# Patient Record
Sex: FEMALE | Race: AMERICAN INDIAN OR ALASKA NATIVE | NOT HISPANIC OR LATINO | Employment: OTHER | URBAN - NONMETROPOLITAN AREA
[De-identification: names, ages, dates, MRNs, and addresses within clinical notes are randomized per-mention and may not be internally consistent; named-entity substitution may affect disease eponyms.]

---

## 2017-04-12 ENCOUNTER — OFFICE VISIT (OUTPATIENT)
Dept: CARDIOLOGY | Facility: PHYSICIAN GROUP | Age: 72
End: 2017-04-12
Payer: MEDICARE

## 2017-04-12 VITALS
OXYGEN SATURATION: 93 % | HEIGHT: 62 IN | SYSTOLIC BLOOD PRESSURE: 150 MMHG | DIASTOLIC BLOOD PRESSURE: 60 MMHG | WEIGHT: 211 LBS | HEART RATE: 91 BPM | BODY MASS INDEX: 38.83 KG/M2

## 2017-04-12 DIAGNOSIS — I45.10 RBBB: ICD-10-CM

## 2017-04-12 DIAGNOSIS — I49.3 SYMPTOMATIC PVCS: ICD-10-CM

## 2017-04-12 DIAGNOSIS — I10 ESSENTIAL HYPERTENSION, BENIGN: ICD-10-CM

## 2017-04-12 DIAGNOSIS — E78.2 MIXED HYPERLIPIDEMIA: ICD-10-CM

## 2017-04-12 DIAGNOSIS — E03.9 ACQUIRED HYPOTHYROIDISM: ICD-10-CM

## 2017-04-12 DIAGNOSIS — R00.2 PALPITATIONS: ICD-10-CM

## 2017-04-12 PROBLEM — E78.00 HYPERCHOLESTEREMIA: Status: ACTIVE | Noted: 2017-04-12

## 2017-04-12 PROCEDURE — 93010 ELECTROCARDIOGRAM REPORT: CPT | Performed by: INTERNAL MEDICINE

## 2017-04-12 PROCEDURE — 99204 OFFICE O/P NEW MOD 45 MIN: CPT | Performed by: INTERNAL MEDICINE

## 2017-04-12 PROCEDURE — 3017F COLORECTAL CA SCREEN DOC REV: CPT | Mod: 8P | Performed by: INTERNAL MEDICINE

## 2017-04-12 PROCEDURE — 1101F PT FALLS ASSESS-DOCD LE1/YR: CPT | Mod: 8P | Performed by: INTERNAL MEDICINE

## 2017-04-12 PROCEDURE — G8432 DEP SCR NOT DOC, RNG: HCPCS | Performed by: INTERNAL MEDICINE

## 2017-04-12 PROCEDURE — G8419 CALC BMI OUT NRM PARAM NOF/U: HCPCS | Performed by: INTERNAL MEDICINE

## 2017-04-12 PROCEDURE — 3014F SCREEN MAMMO DOC REV: CPT | Mod: 8P | Performed by: INTERNAL MEDICINE

## 2017-04-12 PROCEDURE — 4040F PNEUMOC VAC/ADMIN/RCVD: CPT | Mod: 8P | Performed by: INTERNAL MEDICINE

## 2017-04-12 PROCEDURE — 1036F TOBACCO NON-USER: CPT | Performed by: INTERNAL MEDICINE

## 2017-04-12 RX ORDER — ERGOCALCIFEROL 1.25 MG/1
CAPSULE ORAL
COMMUNITY
End: 2023-11-18

## 2017-04-12 RX ORDER — ASCORBIC ACID 500 MG
500 TABLET ORAL DAILY
COMMUNITY

## 2017-04-12 RX ORDER — ACETAMINOPHEN 325 MG/1
650 TABLET ORAL EVERY 4 HOURS PRN
COMMUNITY
End: 2023-11-18

## 2017-04-12 ASSESSMENT — ENCOUNTER SYMPTOMS
BRUISES/BLEEDS EASILY: 0
SEIZURES: 0
FLANK PAIN: 0
FALLS: 0
PND: 0
HEADACHES: 0
LOSS OF CONSCIOUSNESS: 0
POLYDIPSIA: 0
COUGH: 0
WHEEZING: 0
NERVOUS/ANXIOUS: 0
CHILLS: 0
FEVER: 0
DEPRESSION: 0
MYALGIAS: 0
ORTHOPNEA: 0
BLOOD IN STOOL: 0

## 2017-04-12 ASSESSMENT — LIFESTYLE VARIABLES: SUBSTANCE_ABUSE: 0

## 2017-04-12 NOTE — PROGRESS NOTES
Subjective:   Yamileth Walton is a 71 y.o. female who presents today for evaluation of palpitation. These occur spontaneously and are characterized by skipped beats. Holter monitor demonstrated isolated ventricular and atrial ectopy correlating with symptoms.  She has not had chest pain nor dyspnea. She has had no syncope nor near syncope. She does have a history of hypertension and hypothyroidism as well as mixed hyperlipidemia. She has not had sustained arrhythmia. She does occasionally notice her heart rate less than 60 and holds her metoprolol when that occurs.    Past Medical History   Diagnosis Date   • Chronic ITP (idiopathic thrombocytopenia) (CMS-HCC)      Resolved with splenectomy   • Essential hypertension, benign    • Mixed hyperlipidemia    • Acquired hypothyroidism    • RBBB    • Palpitations      Past Surgical History   Procedure Laterality Date   • Abdominal hysterectomy total     • Tonsillectomy     • Splenectomy  1961     maintains immunizations with [facility]     Family History   Problem Relation Age of Onset   • Hypertension Mother    • Thyroid Sister      History   Smoking status   • Never Smoker    Smokeless tobacco   • Never Used     Allergies   Allergen Reactions   • Aspirin      Outpatient Encounter Prescriptions as of 4/12/2017   Medication Sig Dispense Refill   • vitamin D, Ergocalciferol, (DRISDOL) 26214 UNITS Cap capsule Take  by mouth every 7 days.     • MECLIZINE HCL PO Take  by mouth.     • acetaminophen (TYLENOL) 325 MG Tab Take 650 mg by mouth every four hours as needed.     • ascorbic acid (ASCORBIC ACID) 500 MG Tab Take 500 mg by mouth every day.     • metoprolol (LOPRESSOR) 25 MG Tab Take 12.5 mg by mouth 2 times a day. Hold if HR<60     • LISINOPRIL PO Take 40 mg by mouth every day.     • LEVOTHYROXINE SODIUM Take 88 mcg by mouth every day.     • [DISCONTINUED] benzonatate (TESSALON) 200 MG capsule Take 1 Cap by mouth 3 times a day as needed for Cough. (Patient not taking: Reported  "on 4/12/2017) 30 Cap 0     No facility-administered encounter medications on file as of 4/12/2017.     Review of Systems   Constitutional: Negative for fever, chills and malaise/fatigue.   HENT: Negative for nosebleeds.    Respiratory: Negative for cough and wheezing.    Cardiovascular: Negative for orthopnea and PND.   Gastrointestinal: Negative for blood in stool and melena.   Genitourinary: Negative for hematuria and flank pain.   Musculoskeletal: Negative for myalgias and falls.   Skin: Negative for rash.   Neurological: Negative for seizures, loss of consciousness and headaches.   Endo/Heme/Allergies: Negative for polydipsia. Does not bruise/bleed easily.   Psychiatric/Behavioral: Negative for depression and substance abuse. The patient is not nervous/anxious.         Objective:   /60 mmHg  Pulse 91  Ht 1.575 m (5' 2.01\")  Wt 95.709 kg (211 lb)  BMI 38.58 kg/m2  SpO2 93%    Physical Exam   Constitutional: She is oriented to person, place, and time. She appears well-developed and well-nourished. No distress.   HENT:   Mouth/Throat: Oropharynx is clear and moist.   Eyes: Conjunctivae are normal. No scleral icterus.   Neck: No JVD present. No thyromegaly present.   Cardiovascular: Normal rate, regular rhythm, S1 normal and intact distal pulses.  Exam reveals no gallop.    No murmur heard.  Widely but physiologically split second sound   Pulmonary/Chest: Effort normal and breath sounds normal.   Abdominal: Soft. Bowel sounds are normal. She exhibits no abdominal bruit. There is no hepatomegaly.   Musculoskeletal: She exhibits no edema.   Neurological: She is alert and oriented to person, place, and time.   Skin: Skin is warm and dry. She is not diaphoretic.   Psychiatric: She has a normal mood and affect. Thought content normal.       Assessment:     1. Palpitations  [facility] EKG (Clinic Performed)   2. Symptomatic PVCs     3. RBBB     4. Essential hypertension, benign     5. Mixed hyperlipidemia     6. " Acquired hypothyroidism       We need to exclude structural heart disease.  Her blood pressure control is borderline and she has not been taking her metoprolol regularly but only as needed for tachycardia. Lipid and thyroid status are as described by Dr. Davies.    Medical Decision Making:  Today's Assessment / Status / Plan:     Take the metoprolol twice daily holding for slow heart rate rather than waiting for fast heart rate. Echocardiogram. Follow-up based on that result. I will be seeing her in no more than 2 months regardless but immediately if palpitations worsen.  Thank you for the opportunity to see her and participate in her care.

## 2017-04-12 NOTE — Clinical Note
[facility] [address]  Phone: [phone]  Fax: [phone]              Yamileth Walton  1945    Encounter Date: 4/12/2017    Bala Miranda M.D.          PROGRESS NOTE:  Subjective:   Yamileth Walton is a 71 y.o. female who presents today for evaluation of palpitation. These occur spontaneously and are characterized by skipped beats. Holter monitor demonstrated isolated ventricular and atrial ectopy correlating with symptoms.  She has not had chest pain nor dyspnea. She has had no syncope nor near syncope. She does have a history of hypertension and hypothyroidism as well as mixed hyperlipidemia. She has not had sustained arrhythmia. She does occasionally notice her heart rate less than 60 and holds her metoprolol when that occurs.    Past Medical History   Diagnosis Date   • Chronic ITP (idiopathic thrombocytopenia) (CMS-HCC)      Resolved with splenectomy   • Essential hypertension, benign    • Mixed hyperlipidemia    • Acquired hypothyroidism    • RBBB    • Palpitations      Past Surgical History   Procedure Laterality Date   • Abdominal hysterectomy total     • Tonsillectomy     • Splenectomy  1961     maintains immunizations with [facility]     Family History   Problem Relation Age of Onset   • Hypertension Mother    • Thyroid Sister      History   Smoking status   • Never Smoker    Smokeless tobacco   • Never Used     Allergies   Allergen Reactions   • Aspirin      Outpatient Encounter Prescriptions as of 4/12/2017   Medication Sig Dispense Refill   • vitamin D, Ergocalciferol, (DRISDOL) 82278 UNITS Cap capsule Take  by mouth every 7 days.     • MECLIZINE HCL PO Take  by mouth.     • acetaminophen (TYLENOL) 325 MG Tab Take 650 mg by mouth every four hours as needed.     • ascorbic acid (ASCORBIC ACID) 500 MG Tab Take 500 mg by mouth every day.     • metoprolol (LOPRESSOR) 25 MG Tab Take 12.5 mg by mouth 2 times a  "day. Hold if HR<60     • LISINOPRIL PO Take 40 mg by mouth every day.     • LEVOTHYROXINE SODIUM Take 88 mcg by mouth every day.     • [DISCONTINUED] benzonatate (TESSALON) 200 MG capsule Take 1 Cap by mouth 3 times a day as needed for Cough. (Patient not taking: Reported on 4/12/2017) 30 Cap 0     No facility-administered encounter medications on file as of 4/12/2017.     Review of Systems   Constitutional: Negative for fever, chills and malaise/fatigue.   HENT: Negative for nosebleeds.    Respiratory: Negative for cough and wheezing.    Cardiovascular: Negative for orthopnea and PND.   Gastrointestinal: Negative for blood in stool and melena.   Genitourinary: Negative for hematuria and flank pain.   Musculoskeletal: Negative for myalgias and falls.   Skin: Negative for rash.   Neurological: Negative for seizures, loss of consciousness and headaches.   Endo/Heme/Allergies: Negative for polydipsia. Does not bruise/bleed easily.   Psychiatric/Behavioral: Negative for depression and substance abuse. The patient is not nervous/anxious.         Objective:   /60 mmHg  Pulse 91  Ht 1.575 m (5' 2.01\")  Wt 95.709 kg (211 lb)  BMI 38.58 kg/m2  SpO2 93%    Physical Exam   Constitutional: She is oriented to person, place, and time. She appears well-developed and well-nourished. No distress.   HENT:   Mouth/Throat: Oropharynx is clear and moist.   Eyes: Conjunctivae are normal. No scleral icterus.   Neck: No JVD present. No thyromegaly present.   Cardiovascular: Normal rate, regular rhythm, S1 normal and intact distal pulses.  Exam reveals no gallop.    No murmur heard.  Widely but physiologically split second sound   Pulmonary/Chest: Effort normal and breath sounds normal.   Abdominal: Soft. Bowel sounds are normal. She exhibits no abdominal bruit. There is no hepatomegaly.   Musculoskeletal: She exhibits no edema.   Neurological: She is alert and oriented to person, place, and time.   Skin: Skin is warm and dry. " She is not diaphoretic.   Psychiatric: She has a normal mood and affect. Thought content normal.       Assessment:     1. Palpitations  [facility] EKG (Clinic Performed)   2. Symptomatic PVCs     3. RBBB     4. Essential hypertension, benign     5. Mixed hyperlipidemia     6. Acquired hypothyroidism       We need to exclude structural heart disease.  Her blood pressure control is borderline and she has not been taking her metoprolol regularly but only as needed for tachycardia. Lipid and thyroid status are as described by Dr. Davies.    Medical Decision Making:  Today's Assessment / Status / Plan:     Take the metoprolol twice daily holding for slow heart rate rather than waiting for fast heart rate. Echocardiogram. Follow-up based on that result. I will be seeing her in no more than 2 months regardless but immediately if palpitations worsen.  Thank you for the opportunity to see her and participate in her care.        No Recipients

## 2017-04-17 DIAGNOSIS — I49.3 SYMPTOMATIC PVCS: ICD-10-CM

## 2017-04-17 DIAGNOSIS — I10 ESSENTIAL HYPERTENSION, BENIGN: ICD-10-CM

## 2017-04-17 DIAGNOSIS — R00.2 PALPITATIONS: ICD-10-CM

## 2017-05-01 DIAGNOSIS — I49.3 SYMPTOMATIC PVCS: ICD-10-CM

## 2017-05-01 DIAGNOSIS — I10 ESSENTIAL HYPERTENSION, BENIGN: ICD-10-CM

## 2017-05-01 DIAGNOSIS — R00.2 PALPITATIONS: ICD-10-CM

## 2017-05-09 ENCOUNTER — TELEPHONE (OUTPATIENT)
Dept: CARDIOLOGY | Facility: MEDICAL CENTER | Age: 72
End: 2017-05-09

## 2017-07-14 ENCOUNTER — OFFICE VISIT (OUTPATIENT)
Dept: CARDIOLOGY | Facility: PHYSICIAN GROUP | Age: 72
End: 2017-07-14
Payer: MEDICARE

## 2017-07-14 VITALS
OXYGEN SATURATION: 91 % | HEIGHT: 62 IN | WEIGHT: 205 LBS | HEART RATE: 64 BPM | SYSTOLIC BLOOD PRESSURE: 130 MMHG | BODY MASS INDEX: 37.73 KG/M2 | DIASTOLIC BLOOD PRESSURE: 76 MMHG

## 2017-07-14 DIAGNOSIS — I35.8 AORTIC VALVE SCLEROSIS: ICD-10-CM

## 2017-07-14 DIAGNOSIS — I49.3 SYMPTOMATIC PVCS: ICD-10-CM

## 2017-07-14 PROCEDURE — 99212 OFFICE O/P EST SF 10 MIN: CPT | Performed by: INTERNAL MEDICINE

## 2017-07-14 NOTE — PROGRESS NOTES
"Subjective:   Yamileth Walton is a 71 y.o. female who presents today for follow-up of metoprolol therapy. She has done much better with the medication and uses it perfectly, holding if needed for heart rates less than 60. No interval problems and no syncope nor near syncope.    Past Medical History   Diagnosis Date   • Chronic ITP (idiopathic thrombocytopenia) (CMS-HCC)      Resolved with splenectomy   • Essential hypertension, benign    • Mixed hyperlipidemia    • Acquired hypothyroidism    • RBBB    • Palpitations      Past Surgical History   Procedure Laterality Date   • Abdominal hysterectomy total     • Tonsillectomy     • Splenectomy  1961     maintains immunizations with [facility]     Family History   Problem Relation Age of Onset   • Hypertension Mother    • Thyroid Sister      History   Smoking status   • Never Smoker    Smokeless tobacco   • Never Used     Allergies   Allergen Reactions   • Aspirin      Outpatient Encounter Prescriptions as of 7/14/2017   Medication Sig Dispense Refill   • vitamin D, Ergocalciferol, (DRISDOL) 48112 UNITS Cap capsule Take  by mouth every 7 days.     • MECLIZINE HCL PO Take  by mouth.     • acetaminophen (TYLENOL) 325 MG Tab Take 650 mg by mouth every four hours as needed.     • ascorbic acid (ASCORBIC ACID) 500 MG Tab Take 500 mg by mouth every day.     • metoprolol (LOPRESSOR) 25 MG Tab Take 12.5 mg by mouth 2 times a day. Hold if HR<60     • LISINOPRIL PO Take 40 mg by mouth every day.     • LEVOTHYROXINE SODIUM Take 88 mcg by mouth every day.       No facility-administered encounter medications on file as of 7/14/2017.     ROS     Objective:   /76 mmHg  Pulse 64  Ht 1.575 m (5' 2\")  Wt 92.987 kg (205 lb)  BMI 37.49 kg/m2  SpO2 91%    Physical Exam  No other exam change.  Assessment:     1. Symptomatic PVCs       Metoprolol well tolerated. So far no evidence of bradycardic complications.  Medical Decision Making:  Today's Assessment / Status / Plan:     Immediate " reevaluation if any change, otherwise follow-up in 4 months and continue primary follow-up with Dr. Davies.

## 2017-07-14 NOTE — Clinical Note
"     [facility] [address]  Phone: [phone]  Fax: [phone]              Yamileth Walton  1945    Encounter Date: 7/14/2017    Bala Miranda M.D.          PROGRESS NOTE:  Subjective:   Yamileth Walton is a 71 y.o. female who presents today for follow-up of metoprolol therapy. She has done much better with the medication and uses it perfectly, holding if needed for heart rates less than 60. No interval problems and no syncope nor near syncope.    Past Medical History   Diagnosis Date   • Chronic ITP (idiopathic thrombocytopenia) (CMS-HCC)      Resolved with splenectomy   • Essential hypertension, benign    • Mixed hyperlipidemia    • Acquired hypothyroidism    • RBBB    • Palpitations      Past Surgical History   Procedure Laterality Date   • Abdominal hysterectomy total     • Tonsillectomy     • Splenectomy  1961     maintains immunizations with [facility]     Family History   Problem Relation Age of Onset   • Hypertension Mother    • Thyroid Sister      History   Smoking status   • Never Smoker    Smokeless tobacco   • Never Used     Allergies   Allergen Reactions   • Aspirin      Outpatient Encounter Prescriptions as of 7/14/2017   Medication Sig Dispense Refill   • vitamin D, Ergocalciferol, (DRISDOL) 26345 UNITS Cap capsule Take  by mouth every 7 days.     • MECLIZINE HCL PO Take  by mouth.     • acetaminophen (TYLENOL) 325 MG Tab Take 650 mg by mouth every four hours as needed.     • ascorbic acid (ASCORBIC ACID) 500 MG Tab Take 500 mg by mouth every day.     • metoprolol (LOPRESSOR) 25 MG Tab Take 12.5 mg by mouth 2 times a day. Hold if HR<60     • LISINOPRIL PO Take 40 mg by mouth every day.     • LEVOTHYROXINE SODIUM Take 88 mcg by mouth every day.       No facility-administered encounter medications on file as of 7/14/2017.     ROS     Objective:   /76 mmHg  Pulse 64  Ht 1.575 m (5' 2\")  Wt 92.987 kg " (205 lb)  BMI 37.49 kg/m2  SpO2 91%    Physical Exam  No other exam change.  Assessment:     1. Symptomatic PVCs       Metoprolol well tolerated. So far no evidence of bradycardic complications.  Medical Decision Making:  Today's Assessment / Status / Plan:     Immediate reevaluation if any change, otherwise follow-up in 4 months and continue primary follow-up with Dr. Davies.      No Recipients

## 2017-12-30 ENCOUNTER — OFFICE VISIT (OUTPATIENT)
Dept: URGENT CARE | Facility: PHYSICIAN GROUP | Age: 72
End: 2017-12-30
Payer: MEDICARE

## 2017-12-30 VITALS
RESPIRATION RATE: 16 BRPM | BODY MASS INDEX: 39.01 KG/M2 | OXYGEN SATURATION: 94 % | HEART RATE: 84 BPM | DIASTOLIC BLOOD PRESSURE: 68 MMHG | SYSTOLIC BLOOD PRESSURE: 132 MMHG | HEIGHT: 62 IN | TEMPERATURE: 98.8 F | WEIGHT: 212 LBS

## 2017-12-30 DIAGNOSIS — J02.9 PHARYNGITIS, UNSPECIFIED ETIOLOGY: ICD-10-CM

## 2017-12-30 PROCEDURE — 99213 OFFICE O/P EST LOW 20 MIN: CPT | Performed by: PHYSICIAN ASSISTANT

## 2017-12-30 ASSESSMENT — ENCOUNTER SYMPTOMS
SHORTNESS OF BREATH: 0
FEVER: 0
CHILLS: 0
SORE THROAT: 1
COUGH: 1
SINUS PAIN: 0

## 2017-12-30 NOTE — PATIENT INSTRUCTIONS

## 2017-12-30 NOTE — PROGRESS NOTES
Subjective:      Yamileth Walton is a 72 y.o. female who presents with Pharyngitis (since yesterday 12-)            Sore throat which started today when she woke up. Throat was quite sore this morning with difficulty swallowing secondary to discomfort. After using a cough drop her throat is feeling much better. No difficulty swallowing. She also reports sneezing today and nasal congestion. She reports feeling as if she was a little bit wheezy the last 2 nights, but denies any shortness of breath or sputum. No significant cough. No fevers or chills.        Review of Systems   Constitutional: Negative for chills and fever.   HENT: Positive for congestion and sore throat. Negative for ear discharge, ear pain and sinus pain.    Respiratory: Positive for cough (occasional, mild). Negative for shortness of breath.      Allergies:Aspirin    Current Outpatient Prescriptions Ordered in [facility]   Medication Sig Dispense Refill   • metoprolol (LOPRESSOR) 25 MG Tab Take 12.5 mg by mouth 2 times a day. Hold if HR<60     • LISINOPRIL PO Take 40 mg by mouth every day.     • LEVOTHYROXINE SODIUM Take 88 mcg by mouth every day.     • vitamin D, Ergocalciferol, (DRISDOL) 09228 UNITS Cap capsule Take  by mouth every 7 days.     • acetaminophen (TYLENOL) 325 MG Tab Take 650 mg by mouth every four hours as needed.     • ascorbic acid (ASCORBIC ACID) 500 MG Tab Take 500 mg by mouth every day.       No current [facility]-ordered facility-administered medications on file.        Past Medical History:   Diagnosis Date   • Acquired hypothyroidism    • Aortic valve sclerosis 4/26/2017    With mild stenosis, left ventricular ejection fraction of 70% and grade 1 diastolic dysfunction    • Chronic ITP (idiopathic thrombocytopenia) (CMS-HCC)     Resolved with splenectomy   • Essential hypertension, benign    • Mixed hyperlipidemia    • Palpitations    • RBBB        Social History   Substance Use Topics   • Smoking status: Never Smoker   •  "Smokeless tobacco: Never Used   • Alcohol use Not on file       Family Status   Relation Status   • Mother    • Sister      Family History   Problem Relation Age of Onset   • Hypertension Mother    • Thyroid Sister           Objective:     /68   Pulse 84   Temp 37.1 °C (98.8 °F)   Resp 16   Ht 1.575 m (5' 2\")   Wt 96.2 kg (212 lb)   SpO2 94%   BMI 38.78 kg/m²      Physical Exam   Constitutional: She is oriented to person, place, and time. She appears well-developed and well-nourished. No distress.   HENT:   Head: Normocephalic and atraumatic.   Right Ear: External ear normal.   Left Ear: External ear normal.   Mouth/Throat: Oropharynx is clear and moist.   Eyes: Right eye exhibits no discharge. Left eye exhibits no discharge.   Neck: Normal range of motion. Neck supple.   Cardiovascular: Normal rate and regular rhythm.    Pulmonary/Chest: Effort normal and breath sounds normal. She has no wheezes. She has no rales.   Lymphadenopathy:     She has no cervical adenopathy.   Neurological: She is alert and oriented to person, place, and time.   Skin: Skin is warm and dry. She is not diaphoretic.   Psychiatric: She has a normal mood and affect. Her behavior is normal. Judgment and thought content normal.   Nursing note and vitals reviewed.              Assessment/Plan:     1. Pharyngitis, unspecified etiology      No obvious erythema, edema, or exudate. No lymphadenopathy. No fever. Likely viral and/or postnasal drainage. Given written instructions. Follow-up as needed       [facility] [employer] Patient Education given: pharyngitis    Please note that this dictation was created using voice recognition software. I have made every reasonable attempt to correct obvious errors, but I expect that there are errors of grammar and possibly content that I did not discover before finalizing the note.    "

## 2018-01-05 ENCOUNTER — OFFICE VISIT (OUTPATIENT)
Dept: CARDIOLOGY | Facility: PHYSICIAN GROUP | Age: 73
End: 2018-01-05
Payer: MEDICARE

## 2018-01-05 VITALS
DIASTOLIC BLOOD PRESSURE: 60 MMHG | OXYGEN SATURATION: 95 % | WEIGHT: 210 LBS | BODY MASS INDEX: 37.21 KG/M2 | HEIGHT: 63 IN | SYSTOLIC BLOOD PRESSURE: 140 MMHG | HEART RATE: 86 BPM

## 2018-01-05 DIAGNOSIS — I10 ESSENTIAL HYPERTENSION, BENIGN: ICD-10-CM

## 2018-01-05 DIAGNOSIS — R00.2 PALPITATIONS: ICD-10-CM

## 2018-01-05 DIAGNOSIS — I49.3 SYMPTOMATIC PVCS: ICD-10-CM

## 2018-01-05 PROCEDURE — 99214 OFFICE O/P EST MOD 30 MIN: CPT | Performed by: INTERNAL MEDICINE

## 2018-01-05 ASSESSMENT — ENCOUNTER SYMPTOMS
COUGH: 1
EYES NEGATIVE: 1
SPUTUM PRODUCTION: 0
DEPRESSION: 0
NAUSEA: 0
WHEEZING: 0
INSOMNIA: 0
HEARTBURN: 0
MUSCULOSKELETAL NEGATIVE: 1
NEUROLOGICAL NEGATIVE: 1
WEIGHT LOSS: 0
NERVOUS/ANXIOUS: 0
BRUISES/BLEEDS EASILY: 0
SHORTNESS OF BREATH: 0
PND: 0

## 2018-01-05 NOTE — PROGRESS NOTES
Subjective:   Yamileth Walton is a 72 y.o. female who presents in f/u in regards to her PVCs and HTN    Changing MDs to me  Feeling well - has a headcold, but great cataract surgery  Still beading  All meds as directed         Past Medical History:   Diagnosis Date   • Acquired hypothyroidism    • Aortic valve sclerosis 4/26/2017    With mild stenosis, left ventricular ejection fraction of 70% and grade 1 diastolic dysfunction    • Chronic ITP (idiopathic thrombocytopenia) (CMS-HCC)     Resolved with splenectomy   • Essential hypertension, benign    • Mixed hyperlipidemia    • Palpitations    • RBBB      Past Surgical History:   Procedure Laterality Date   • SPLENECTOMY  1961    maintains immunizations with [facility]   • ABDOMINAL HYSTERECTOMY TOTAL     • TONSILLECTOMY       Family History   Problem Relation Age of Onset   • Hypertension Mother    • Thyroid Sister      History   Smoking Status   • Never Smoker   Smokeless Tobacco   • Never Used     Allergies   Allergen Reactions   • Aspirin      Outpatient Encounter Prescriptions as of 1/5/2018   Medication Sig Dispense Refill   • vitamin D, Ergocalciferol, (DRISDOL) 11528 UNITS Cap capsule Take  by mouth every 7 days.     • acetaminophen (TYLENOL) 325 MG Tab Take 650 mg by mouth every four hours as needed.     • ascorbic acid (ASCORBIC ACID) 500 MG Tab Take 500 mg by mouth every day.     • metoprolol (LOPRESSOR) 25 MG Tab Take 12.5 mg by mouth 2 times a day. Hold if HR<60     • LISINOPRIL PO Take 40 mg by mouth every day.     • LEVOTHYROXINE SODIUM Take 88 mcg by mouth every day.       No facility-administered encounter medications on file as of 1/5/2018.      Review of Systems   Constitutional: Negative for malaise/fatigue and weight loss.   HENT: Positive for congestion. Negative for hearing loss.    Eyes: Negative.    Respiratory: Positive for cough. Negative for sputum production, shortness of breath and wheezing.    Cardiovascular: Negative for chest pain, leg  "swelling and PND.   Gastrointestinal: Negative for heartburn and nausea.   Musculoskeletal: Negative.    Neurological: Negative.    Endo/Heme/Allergies: Does not bruise/bleed easily.   Psychiatric/Behavioral: Negative for depression. The patient is not nervous/anxious and does not have insomnia.    All other systems reviewed and are negative.       Objective:   /60   Pulse 86   Ht 1.6 m (5' 3\")   Wt 95.3 kg (210 lb)   SpO2 95%   BMI 37.20 kg/m²     Physical Exam   Constitutional: She is oriented to person, place, and time. She appears well-developed and well-nourished.   HENT:   Head: Normocephalic and atraumatic.   Neck: No JVD present. No thyromegaly present.   Cardiovascular: Normal rate, regular rhythm and intact distal pulses.  Exam reveals no gallop.    Pulmonary/Chest: Breath sounds normal. She exhibits no tenderness.   Abdominal: Bowel sounds are normal.   Musculoskeletal: She exhibits no tenderness.   Lymphadenopathy:     She has no cervical adenopathy.   Neurological: She is alert and oriented to person, place, and time. Coordination normal.   Skin: Skin is warm and dry. No rash noted.   Psychiatric: Her behavior is normal.       Assessment:     1. Symptomatic PVCs     2. Essential hypertension, benign     3. Palpitations         Medical Decision Making:  Today's Assessment / Status / Plan:     Palps   Echo reviewed from last April - normal & reassuring  Discussed physiology and meds  No set backs  Annual labs with PCP    HTN   BP great today  Wt loss and diet gone over  Labs as above    She was seeing my partner every 4m - will stagger this year if feeling well    "

## 2018-01-05 NOTE — LETTER
[facility] [address]  Phone: [phone]  Fax: [phone]              Yamileth Walton  1945    Encounter Date: 1/5/2018    Coral Pozo M.D.          PROGRESS NOTE:  Subjective:   Yamileth Walton is a 72 y.o. female who presents in f/u in regards to her PVCs and HTN    Changing MDs to me  Feeling well - has a headcold, but great cataract surgery  Still beading  All meds as directed         Past Medical History:   Diagnosis Date   • Acquired hypothyroidism    • Aortic valve sclerosis 4/26/2017    With mild stenosis, left ventricular ejection fraction of 70% and grade 1 diastolic dysfunction    • Chronic ITP (idiopathic thrombocytopenia) (CMS-HCC)     Resolved with splenectomy   • Essential hypertension, benign    • Mixed hyperlipidemia    • Palpitations    • RBBB      Past Surgical History:   Procedure Laterality Date   • SPLENECTOMY  1961    maintains immunizations with [facility]   • ABDOMINAL HYSTERECTOMY TOTAL     • TONSILLECTOMY       Family History   Problem Relation Age of Onset   • Hypertension Mother    • Thyroid Sister      History   Smoking Status   • Never Smoker   Smokeless Tobacco   • Never Used     Allergies   Allergen Reactions   • Aspirin      Outpatient Encounter Prescriptions as of 1/5/2018   Medication Sig Dispense Refill   • vitamin D, Ergocalciferol, (DRISDOL) 38268 UNITS Cap capsule Take  by mouth every 7 days.     • acetaminophen (TYLENOL) 325 MG Tab Take 650 mg by mouth every four hours as needed.     • ascorbic acid (ASCORBIC ACID) 500 MG Tab Take 500 mg by mouth every day.     • metoprolol (LOPRESSOR) 25 MG Tab Take 12.5 mg by mouth 2 times a day. Hold if HR<60     • LISINOPRIL PO Take 40 mg by mouth every day.     • LEVOTHYROXINE SODIUM Take 88 mcg by mouth every day.       No facility-administered encounter medications on file as of 1/5/2018.      Review of Systems   Constitutional:  "Negative for malaise/fatigue and weight loss.   HENT: Positive for congestion. Negative for hearing loss.    Eyes: Negative.    Respiratory: Positive for cough. Negative for sputum production, shortness of breath and wheezing.    Cardiovascular: Negative for chest pain, leg swelling and PND.   Gastrointestinal: Negative for heartburn and nausea.   Musculoskeletal: Negative.    Neurological: Negative.    Endo/Heme/Allergies: Does not bruise/bleed easily.   Psychiatric/Behavioral: Negative for depression. The patient is not nervous/anxious and does not have insomnia.    All other systems reviewed and are negative.       Objective:   /60   Pulse 86   Ht 1.6 m (5' 3\")   Wt 95.3 kg (210 lb)   SpO2 95%   BMI 37.20 kg/m²      Physical Exam   Constitutional: She is oriented to person, place, and time. She appears well-developed and well-nourished.   HENT:   Head: Normocephalic and atraumatic.   Neck: No JVD present. No thyromegaly present.   Cardiovascular: Normal rate, regular rhythm and intact distal pulses.  Exam reveals no gallop.    Pulmonary/Chest: Breath sounds normal. She exhibits no tenderness.   Abdominal: Bowel sounds are normal.   Musculoskeletal: She exhibits no tenderness.   Lymphadenopathy:     She has no cervical adenopathy.   Neurological: She is alert and oriented to person, place, and time. Coordination normal.   Skin: Skin is warm and dry. No rash noted.   Psychiatric: Her behavior is normal.       Assessment:     1. Symptomatic PVCs     2. Essential hypertension, benign     3. Palpitations         Medical Decision Making:  Today's Assessment / Status / Plan:     Palps   Echo reviewed from last April - normal & reassuring  Discussed physiology and meds  No set backs  Annual labs with PCP    HTN   BP great today  Wt loss and diet gone over  Labs as above    She was seeing my partner every 4m - will stagger this year if feeling well        Manuelito Walker M.D.  [address]  [place] " [address]  VIA Facsimile: [phone]

## 2018-07-16 ENCOUNTER — OFFICE VISIT (OUTPATIENT)
Dept: CARDIOLOGY | Facility: PHYSICIAN GROUP | Age: 73
End: 2018-07-16
Payer: MEDICARE

## 2018-07-16 VITALS
HEIGHT: 63 IN | WEIGHT: 216 LBS | HEART RATE: 62 BPM | BODY MASS INDEX: 38.27 KG/M2 | OXYGEN SATURATION: 94 % | DIASTOLIC BLOOD PRESSURE: 58 MMHG | SYSTOLIC BLOOD PRESSURE: 118 MMHG

## 2018-07-16 DIAGNOSIS — I45.10 RBBB: ICD-10-CM

## 2018-07-16 DIAGNOSIS — I49.3 SYMPTOMATIC PVCS: ICD-10-CM

## 2018-07-16 PROBLEM — R00.2 PALPITATIONS: Status: RESOLVED | Noted: 2017-04-12 | Resolved: 2018-07-16

## 2018-07-16 PROBLEM — I10 ESSENTIAL HYPERTENSION, BENIGN: Status: RESOLVED | Noted: 2017-04-12 | Resolved: 2018-07-16

## 2018-07-16 PROBLEM — I35.8 AORTIC VALVE SCLEROSIS: Status: RESOLVED | Noted: 2017-04-26 | Resolved: 2018-07-16

## 2018-07-16 PROCEDURE — 99214 OFFICE O/P EST MOD 30 MIN: CPT | Performed by: INTERNAL MEDICINE

## 2018-07-16 ASSESSMENT — ENCOUNTER SYMPTOMS
SPUTUM PRODUCTION: 0
MUSCULOSKELETAL NEGATIVE: 1
WEIGHT LOSS: 0
PND: 0
BRUISES/BLEEDS EASILY: 0
COUGH: 0
LOSS OF CONSCIOUSNESS: 0
NERVOUS/ANXIOUS: 0
HEARTBURN: 0
DIZZINESS: 0
FEVER: 0
INSOMNIA: 0
EYES NEGATIVE: 1
DEPRESSION: 0
WHEEZING: 0
NEUROLOGICAL NEGATIVE: 1
NAUSEA: 0
SHORTNESS OF BREATH: 0

## 2018-10-13 ENCOUNTER — HOSPITAL ENCOUNTER (EMERGENCY)
Facility: MEDICAL CENTER | Age: 73
End: 2018-10-13
Attending: EMERGENCY MEDICINE
Payer: MEDICARE

## 2018-10-13 ENCOUNTER — APPOINTMENT (OUTPATIENT)
Dept: RADIOLOGY | Facility: MEDICAL CENTER | Age: 73
End: 2018-10-13
Attending: EMERGENCY MEDICINE
Payer: MEDICARE

## 2018-10-13 VITALS
OXYGEN SATURATION: 93 % | HEART RATE: 95 BPM | DIASTOLIC BLOOD PRESSURE: 85 MMHG | WEIGHT: 213.41 LBS | SYSTOLIC BLOOD PRESSURE: 157 MMHG | HEIGHT: 63 IN | TEMPERATURE: 98.6 F | RESPIRATION RATE: 16 BRPM | BODY MASS INDEX: 37.81 KG/M2

## 2018-10-13 DIAGNOSIS — S42.92XA CLOSED FRACTURE OF LEFT SHOULDER, INITIAL ENCOUNTER: ICD-10-CM

## 2018-10-13 DIAGNOSIS — S80.01XA CONTUSION OF RIGHT KNEE, INITIAL ENCOUNTER: ICD-10-CM
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
## 2018-10-13 PROCEDURE — 73564 X-RAY EXAM KNEE 4 OR MORE: CPT | Mod: RT

## 2018-10-13 PROCEDURE — 99284 EMERGENCY DEPT VISIT MOD MDM: CPT

## 2018-10-13 PROCEDURE — 700102 HCHG RX REV CODE 250 W/ 637 OVERRIDE(OP): Performed by: EMERGENCY MEDICINE

## 2018-10-13 PROCEDURE — A9270 NON-COVERED ITEM OR SERVICE: HCPCS | Performed by: EMERGENCY MEDICINE

## 2018-10-13 PROCEDURE — 73030 X-RAY EXAM OF SHOULDER: CPT | Mod: LT

## 2018-10-13 RX ORDER — HYDROCODONE BITARTRATE AND ACETAMINOPHEN 5; 325 MG/1; MG/1
1 TABLET ORAL EVERY 6 HOURS PRN
Qty: 15 TAB | Refills: 0 | Status: SHIPPED | OUTPATIENT
Start: 2018-10-13 | End: 2018-10-17

## 2018-10-13 RX ORDER — ACETAMINOPHEN 325 MG/1
650 TABLET ORAL ONCE
Status: COMPLETED | OUTPATIENT
Start: 2018-10-13 | End: 2018-10-13

## 2018-10-13 RX ADMIN — ACETAMINOPHEN 650 MG: 325 TABLET, FILM COATED ORAL at 13:54

## 2018-10-13 ASSESSMENT — PAIN SCALES - GENERAL: PAINLEVEL_OUTOF10: 8

## 2018-10-13 ASSESSMENT — ENCOUNTER SYMPTOMS
LOSS OF CONSCIOUSNESS: 0
FALLS: 1

## 2018-10-13 NOTE — ED NOTES
Pt discharge home. Pt given discharge instructions and prescription.Advised pt to have somebody to drive her home. Pt verbalized understanding, all questions answered ,vss upon d/c. Pt steady on feet upon discharge

## 2018-10-13 NOTE — ED PROVIDER NOTES
ED Provider Note    Scribed for Manuelito Cowan M.D. by Candelario Randall. 10/13/2018, 12:38 PM.    Primary care provider: Manuelito Walker M.D.  Means of arrival: Walk in  History obtained from: Patient  History limited by: None    CHIEF COMPLAINT  Chief Complaint   Patient presents with   • T-5000 GLF     able to ambulate in triage c/o mglf, states missed a step on the side walk then fell. c/o bilateral knee pain and left arm/shoulder pain. CMS intact.        HPI  Yamileth OSMAN Walton is a 73 y.o. female who presents to the Emergency Department for evaluation of mechanical ground level fall occurring this morning. The patient went to step up onto the sidewalk and immediately she tripped and developed left wrist swelling, left shoulder and right knee pain. She denies hitting her head and loss of consciousness.  Denies any injury to her chest abdomen or pelvis.  Has pain mostly in the left shoulder and right knee at this time.  Is been able to walk.  Denies blood thinners.  Denies any other acute concerns or complaints per patient has a history of hypertension.    REVIEW OF SYSTEMS  Review of Systems   Musculoskeletal: Positive for falls and joint pain.   Neurological: Negative for loss of consciousness.       PAST MEDICAL HISTORY   has a past medical history of Acquired hypothyroidism; Aortic valve sclerosis (4/26/2017); Chronic ITP (idiopathic thrombocytopenia) (HCC); Essential hypertension, benign; Mixed hyperlipidemia; Palpitations; and RBBB.    SURGICAL HISTORY   has a past surgical history that includes abdominal hysterectomy total; tonsillectomy; and splenectomy (1961).    SOCIAL HISTORY  Social History   Substance Use Topics   • Smoking status: Never Smoker   • Smokeless tobacco: Never Used      History   Drug use: Unknown       FAMILY HISTORY  Family History   Problem Relation Age of Onset   • Hypertension Mother    • Thyroid Sister        CURRENT MEDICATIONS  No current facility-administered medications for  "this encounter.     Current Outpatient Prescriptions:   •  vitamin D, Ergocalciferol, (DRISDOL) 37447 UNITS Cap capsule, Take  by mouth every 7 days., Disp: , Rfl:   •  acetaminophen (TYLENOL) 325 MG Tab, Take 650 mg by mouth every four hours as needed., Disp: , Rfl:   •  ascorbic acid (ASCORBIC ACID) 500 MG Tab, Take 500 mg by mouth every day., Disp: , Rfl:   •  metoprolol (LOPRESSOR) 25 MG Tab, Take 12.5 mg by mouth 2 times a day. Hold if HR<60, Disp: , Rfl:   •  LISINOPRIL PO, Take 40 mg by mouth every day., Disp: , Rfl:   •  LEVOTHYROXINE SODIUM, Take 88 mcg by mouth every day., Disp: , Rfl:       ALLERGIES  Allergies   Allergen Reactions   • Aspirin    • Latex Itching       PHYSICAL EXAM  VITAL SIGNS: /83   Pulse (!) 109   Temp 37 °C (98.6 °F) (Temporal)   Resp 16   Ht 1.6 m (5' 2.99\")   Wt 96.8 kg (213 lb 6.5 oz)   SpO2 93%   BMI 37.81 kg/m²   Vitals reviewed.  Constitutional: Well developed, Well nourished, No acute distress, Non-toxic appearance.   HENT: Normocephalic, Atraumatic, Bilateral external ears normal, Oropharynx moist, No oral exudates, Nose normal.   Eyes: PERRL, EOMI, Conjunctiva normal, No discharge.   Neck: Normal range of motion, No tenderness, Supple, No stridor.   Cardiovascular: Normal heart rate, Normal rhythm, No murmurs, No rubs, No gallops.   Thorax & Lungs: Normal breath sounds, No respiratory distress, No wheezing, No chest tenderness.   Abdomen: Bowel sounds normal, Soft, No tenderness, no signs of trauma per  Skin: Warm, Dry, No erythema, No rash.   Back: No tenderness, No CVA tenderness.   Musculoskeletal: Tenderness and swelling over the left glenohumeral joint, tenderness with internal rotation, flexion and extension, Ecchymosis on medial aspect to the right knee, small abrasion to the right knee.  No other significant tenderness.  Normal neurovascular exam in all 4 extremities per  Neurologic: Alert, Normal motor function, Neurovascularly intact " distally    RADIOLOGY  DX-KNEE COMPLETE 4+ RIGHT   Final Result      No evidence of fracture or dislocation.   Mild osteoarthritis of the right knee.      DX-SHOULDER 2+ LEFT   Final Result      Acute proximal humeral fractures with some intra-articular extension        The radiologist's interpretation of all radiological studies have been reviewed by me.    COURSE & MEDICAL DECISION MAKING  Pertinent Labs & Imaging studies reviewed. (See chart for details)    12:38 PM Patient seen and examined at bedside. The patient presents status post fall with left wrist swelling, left shoulder and right knee pain, and the differential diagnosis includes but is not limited to fracture vs dislocation vs contusion. Ordered for DX left should and right knee to evaluate.     1:47 PM I informed the patient of her imaging results and that she will be placed in a shoulder immobilizer. She is advised to follow up with orthopedics for further evaluation. She is advised to rest and to return should she develop pain, swelling, or other concerns. The patient understands and agrees to discharge home.      She is able to ambulate well.  Her knee is very minimally tender.  There is likely a contusion on the trochars immobilizer is no signs of ligamentous instability.  Shoulder is fractured and she is educated to the nonoperative plan the status of shoulder fractures and the need for follow-up with orthopedic surgery.  We will put on Norco for nighttime pain.  She is counseled about this.  She is also counseled she should not take acetaminophen and Norco at the same time.  Her questions are answered, she is agreeable to plan.  She is discharged in good condition.  She appears to be otherwise uninjured has no other complaints of pain.    The patient will return for new or worsening symptoms and is stable at the time of discharge.    In prescribing controlled substances to this patient, I certify that I have obtained and reviewed the medical  history of Yamileth Walton. I have also made a good brad effort to obtain applicable records from other providers who have treated the patient and no other records are available at this time.     I have conducted a physical exam and documented it. I have reviewed Ms. Walton’s prescription history as maintained by the [place] Prescription Monitoring Program.     I have assessed the patient’s risk for abuse, dependency, and addiction using the validated Opioid Risk Tool available at https://www.mdcalc.com/iarzce-vhca-dbhh-ort-narcotic-abuse.     Given the above, I believe the benefits of controlled substance therapy outweigh the risks. The reasons for prescribing controlled substances include in my professional opinion, controlled substances are the only reasonable choice for this patient because Severe pain secondary to a fracture. Accordingly, I have discussed the risk and benefits, treatment plan, and alternative therapies with the patient.         DISPOSITION:  Patient will be discharged home in stable condition.    FOLLOW UP:  Avinash Gonzalez M.D.  [address] [facility] [address]  [phone]    Schedule an appointment as soon as possible for a visit in 2 days        OUTPATIENT MEDICATIONS:  Discharge Medication List as of 10/13/2018  1:56 PM      START taking these medications    Details   HYDROcodone-acetaminophen (NORCO) 5-325 MG Tab per tablet Take 1 Tab by mouth every 6 hours as needed for up to 4 days., Disp-15 Tab, R-0, Print Rx Paper, For 4 days               FINAL IMPRESSION  1. Fall, initial encounter    2. Closed fracture of left shoulder, initial encounter    3. Contusion of right knee, initial encounter         ICandelario (Joey), am scribing for, and in the presence of, Manuelito Cowan M.D..     Electronically signed by: Candelario Randall (Joey), 10/13/2018     Manuelito CEJA M.D. personally performed the services described in this documentation, as scribed by Candelario  KISHOR Randall in my presence, and it is both accurate and complete. E    The note accurately reflects work and decisions made by me.  Manuelito Cowan  10/13/2018  3:12 PM

## 2018-10-13 NOTE — ED TRIAGE NOTES
Chief Complaint   Patient presents with   • T-5000 GLF     able to ambulate in triage c/o mglf, states missed a step on the side walk then fell. c/o bilateral knee pain and left arm/shoulder pain. CMS intact.      Noted abrasion in right knee and small ecchymosis in left knee. No deformity noted. Pt able to move left upper extremities w/slight limitation d/t pain, no swelling/deformity noted.

## 2018-10-13 NOTE — ED NOTES
Pt wheeled to purp 77, here for left shoulder pain with limited rom and bilateral knee pain with abrasion.

## 2022-06-16 ENCOUNTER — TELEPHONE (OUTPATIENT)
Dept: CARDIOLOGY | Facility: PHYSICIAN GROUP | Age: 77
End: 2022-06-16
Payer: MEDICARE

## 2022-06-16 NOTE — TELEPHONE ENCOUNTER
Called patient in regards to upcoming appointment scheduled on 06/30 with CW. Per patient she has not seen another cardiology since [place] in 2018. Medical records are up to date & within chart.     Confirmed appointment date, time, & location. Advised to please wear a mask and to call main office if their were any questions or concerns.

## 2022-06-30 ENCOUNTER — OFFICE VISIT (OUTPATIENT)
Dept: CARDIOLOGY | Facility: PHYSICIAN GROUP | Age: 77
End: 2022-06-30
Payer: MEDICARE

## 2022-06-30 VITALS
SYSTOLIC BLOOD PRESSURE: 138 MMHG | HEART RATE: 89 BPM | BODY MASS INDEX: 35.7 KG/M2 | OXYGEN SATURATION: 94 % | HEIGHT: 62 IN | DIASTOLIC BLOOD PRESSURE: 64 MMHG | RESPIRATION RATE: 16 BRPM | WEIGHT: 194 LBS

## 2022-06-30 DIAGNOSIS — E66.9 OBESITY (BMI 30-39.9): Chronic | ICD-10-CM

## 2022-06-30 DIAGNOSIS — I10 PRIMARY HYPERTENSION: ICD-10-CM

## 2022-06-30 DIAGNOSIS — E78.2 MIXED HYPERLIPIDEMIA: ICD-10-CM

## 2022-06-30 DIAGNOSIS — I49.3 SYMPTOMATIC PVCS: ICD-10-CM

## 2022-06-30 DIAGNOSIS — I35.0 NONRHEUMATIC AORTIC VALVE STENOSIS: Chronic | ICD-10-CM

## 2022-06-30 DIAGNOSIS — R01.0 BENIGN AND INNOCENT CARDIAC MURMURS: ICD-10-CM

## 2022-06-30 DIAGNOSIS — Z90.81 H/O SPLENECTOMY: Chronic | ICD-10-CM

## 2022-06-30 DIAGNOSIS — I45.10 RIGHT BUNDLE BRANCH BLOCK: ICD-10-CM

## 2022-06-30 PROCEDURE — 99204 OFFICE O/P NEW MOD 45 MIN: CPT | Performed by: INTERNAL MEDICINE

## 2022-06-30 ASSESSMENT — ENCOUNTER SYMPTOMS
FALLS: 0
WEAKNESS: 0
FOCAL WEAKNESS: 0
BRUISES/BLEEDS EASILY: 0
NAUSEA: 0
CHILLS: 0
SHORTNESS OF BREATH: 0
SORE THROAT: 0
CLAUDICATION: 0
BLURRED VISION: 0
DIZZINESS: 0
COUGH: 0
FEVER: 0
ABDOMINAL PAIN: 0
PALPITATIONS: 0
PND: 0

## 2022-06-30 NOTE — PATIENT INSTRUCTIONS
Work on at least 1.5 - 5 hours a week of moderate exercise (typical brisk walking or similar activity)    If you have had a heart attack, stent, bypass or reduced heart strength (EF <35%): cardiac rehab may reduce your risk of dying by 13-24% and need to go to the hospital by 30% within the first year (1)    Please look into the following diets and incorporate them into your diet    LOW SALT DIET   KEEP YOUR SODIUM EQUAL TO CALORIES AND NO MORE THAN DOUBLE THE CALORIES FOR A LOW SALT DIET    Cardiosmart.org - great resource for American College of Cardiology on heart disease prevention and treatment    FOR TREATMENT OR PREVENTION OF CORONARY ARTERY DISEASE  These three programs are approved by Medicare/Insurers for those with heart disease  [facility]  Dr. Jain's Program for Reversing Heart Disease - Moustapha Andersen's Cardiologist vegetarian-based  [facility] - [place]-based mind-body Program    This is a commonly referenced Program  Dr Medina - Maria Eugenia over [place] (book and documentary) - vegetarian-based    FOR TREATMENT OF BLOOD PRESSURE  DASH DIET - American Heart Association for treatment of HYPERTENSION    FOR TREATMENT OF BAD CHOLESTEROL/FATS  REDUCE PROCESSED SUGAR AS MUCH AS POSSIBLE  INCREASE WHOLE GRAINS/VEGETABLES  INCREASE FIBER    Lowering total cholesterol and LDL (bad) cholesterol:  - Eat leaner cuts of meat, or eliminate altogether if possible red meat, and frequently substitute fish or chicken.  - Limit saturated fat to no more than 7-10% of total calories no more than 10 g per day is recommended. Some sources of saturated fat include butter, animal fats, hydrogenated vegetable fats and oils, many desserts, whole milk dairy products.  - Replaced saturated fats with polyunsaturated fats and monounsaturated fats. Foods high in monounsaturated fat include nuts, canola oil, avocados, and olives.  - Limit trans fat (processed foods)  and replaced with fresh fruits and vegetables  - Recommend nonfat dairy products  - Increase substantially the amount of soluble fiber intake (legumes such as beans, fruit, whole grains).  - Consider nutritional supplements: plant sterile spreads such as Benecol, fish oil,  flaxseed oil, omega-3 acids capsules 1000 mg twice a day, or viscous fiber such as Metamucil  - Attain ideal weight and regular exercise (at least 30 minutes per day of moderate exercise)  ASK ABOUT STATIN OR NON STATIN MEDICATION TO REDUCE YOUR LDL AND HEART RISK    Lowering triglycerides:  - Reduce intake of simple sugar: Desserts, candy, pastries, honey, sodas, sugared cereals, yogurt, Gatorade, sports bars, canned fruit, smoothies, fruit juice, coffee drinks  - Reduced intake of refined starches: Refined Pasta, most bread  - Reduce or abstain from alcohol  - Increase omega-3 fatty acids: [place], Trout, Mackerel, [place]ring, [place] tuna and supplements  - Attain ideal weight and regular exercise (at least 30 minutes per day of moderate exercise)  ASK ABOUT PURIFIED OMEGA 3 EPA or FISH OIL TO REDUCE YOUR TG AND HEART RISK    Elevating HDL (good) cholesterol:  - Increase physical activity  - Increase omega-3 fatty acids and supplements as listed above  - Incorporating appropriate amounts of monounsaturated fats such as nuts, olive oil, canola oil, avocados, olives  - Stop smoking  - Attain ideal weight and regular exercise (at least 30 minutes per day of moderate exercise)

## 2022-06-30 NOTE — PROGRESS NOTES
Chief Complaint   Patient presents with   • Heart Murmur     NP Dx: Benign and innocent cardiac murmurs       Subjective     Yamileth OSMAN Walton is a 76 y.o. female who presents today in consultation from [facility] evaluation of her history of aortic stenosis last seen us over 3 years ago.  Right bundle branch block and aortic stenosis have progressed in the time that we were seeing her she has had some hypertension otherwise doing well    Past Medical History:   Diagnosis Date   • Acquired hypothyroidism    • Aortic valve sclerosis 4/26/2017    With mild stenosis, left ventricular ejection fraction of 70% and grade 1 diastolic dysfunction    • Chronic ITP (idiopathic thrombocytopenia) (HCC)     Resolved with splenectomy   • Essential hypertension, benign    • Mixed hyperlipidemia    • Nonrheumatic aortic valve stenosis    • Palpitations    • RBBB      Past Surgical History:   Procedure Laterality Date   • SPLENECTOMY  1961    maintains immunizations with [facility]   • ABDOMINAL HYSTERECTOMY TOTAL     • TONSILLECTOMY       Family History   Problem Relation Age of Onset   • Hypertension Mother    • Thyroid Sister      Social History     Socioeconomic History   • Marital status:      Spouse name: Not on file   • Number of children: Not on file   • Years of education: Not on file   • Highest education level: Not on file   Occupational History   • Not on file   Tobacco Use   • Smoking status: Never Smoker   • Smokeless tobacco: Never Used   Substance and Sexual Activity   • Alcohol use: Not on file   • Drug use: Not on file   • Sexual activity: Not on file   Other Topics Concern   • Not on file   Social History Narrative   • Not on file     Social Determinants of Health     Financial Resource Strain: Not on file   Food Insecurity: Not on file   Transportation Needs: Not on file   Physical Activity: Not on file   Stress: Not on file   Social Connections: Not on file   Intimate Partner Violence: Not on file   Housing  Stability: Not on file     Allergies   Allergen Reactions   • Aspirin    • Latex Itching     Outpatient Encounter Medications as of 6/30/2022   Medication Sig Dispense Refill   • vitamin D, Ergocalciferol, (DRISDOL) 05722 UNITS Cap capsule Take  by mouth every 7 days.     • acetaminophen (TYLENOL) 325 MG Tab Take 650 mg by mouth every four hours as needed.     • ascorbic acid (ASCORBIC ACID) 500 MG Tab Take 500 mg by mouth every day.     • metoprolol (LOPRESSOR) 25 MG Tab Take 12.5 mg by mouth 2 times a day. Hold if HR<60     • LISINOPRIL PO Take 40 mg by mouth every day.     • LEVOTHYROXINE SODIUM Take 88 mcg by mouth every day.       No facility-administered encounter medications on file as of 6/30/2022.     Review of Systems   Constitutional: Negative for chills and fever.   HENT: Negative for sore throat.    Eyes: Negative for blurred vision.   Respiratory: Negative for cough and shortness of breath.    Cardiovascular: Negative for chest pain, palpitations, claudication, leg swelling and PND.   Gastrointestinal: Negative for abdominal pain and nausea.   Musculoskeletal: Negative for falls and joint pain.   Skin: Negative for rash.   Neurological: Negative for dizziness, focal weakness and weakness.   Endo/Heme/Allergies: Does not bruise/bleed easily.              Objective     BP (!) 0/0 (BP Location: Left arm, Patient Position: Sitting, BP Cuff Size: Adult)     Physical Exam  Constitutional:       General: She is not in acute distress.     Appearance: She is not diaphoretic.   Eyes:      General: No scleral icterus.  Neck:      Vascular: No JVD.   Cardiovascular:      Rate and Rhythm: Normal rate.      Heart sounds: Murmur heard.     No friction rub. No gallop.   Pulmonary:      Effort: No respiratory distress.      Breath sounds: No wheezing or rales.   Abdominal:      General: Bowel sounds are normal.      Palpations: Abdomen is soft.   Skin:     Findings: No rash.   Neurological:      Mental Status: She is  alert.              We reviewed in person the most recent labs    Last echo in 2017 showed mild aortic stenosis        Assessment & Plan     1. Benign and innocent cardiac murmurs  EKG   2. Mixed hyperlipidemia     3. RBBB     4. Symptomatic PVCs     5. Nonrheumatic aortic valve stenosis  EC-ECHOCARDIOGRAM COMPLETE W/O CONT   6. Primary hypertension     7. H/O splenectomy     8. Obesity (BMI 30-39.9)         Medical Decision Making: Today's Assessment/Status/Plan:         It was my pleasure to meet with Ms. Walton.    We addressed the management of hypertension at today's visit. Blood pressure is well controlled.  We specifically assessed the labs on hypertension treatment    We addressed the management of dyslipidemia at today's visit.   Consider statin depending on reults    Check echo    I will see Ms. Walton back in 1 year time and encouraged her to follow up with us over the phone or electronically using my MyChart as issues arise.    It is my pleasure to participate in the care of Ms. Walton.  Please do not hesitate to contact me with questions or concerns.    Gaudencio Hi MD PhD Located within [facility]  Cardiologist [facility]    Please note that this dictation was created using voice recognition software. There may be errors I did not discover before finalizing the note.

## 2022-07-06 ENCOUNTER — TELEPHONE (OUTPATIENT)
Dept: CARDIOLOGY | Facility: MEDICAL CENTER | Age: 77
End: 2022-07-06
Payer: MEDICARE

## 2022-07-06 NOTE — TELEPHONE ENCOUNTER
Task deferred to imaging out of net auth pool to send testing as requested via staff message.    Attempted to call pt, [phone], unable to reach.  Left detailed voicemail regarding findings and to return this call at their earliest convenience if she has further questions or concerns.

## 2022-07-06 NOTE — TELEPHONE ENCOUNTER
CW    Caller: Yamileth  Topic/issue: Pt called to have us send over the echo referral over to [facility] in [place] to have done there.    Callback Number: [phone]

## 2022-09-09 DIAGNOSIS — I35.0 NONRHEUMATIC AORTIC VALVE STENOSIS: Chronic | ICD-10-CM

## 2023-10-11 NOTE — PROGRESS NOTES
Addended by: JONATHAN LACY on: 10/11/2023 11:37 AM     Modules accepted: Orders     Chief Complaint   Patient presents with   • Premature Ventricular Contractions (PVCs)       Subjective:   Yamileth OSMAN Walton is a 72 y.o. female who presents today in follow-up in regards to her mild aortic stenosis on echo 2016 as well as hypertension and hyperlipidemia with palpitation she manages on metoprolol    No setbacks, had another great grandchild  Still traveling with her friends for  crafting.  No setbacks  Metoprolol really helps she only takes half a pill and holds it if her heart rates at low    Wishes she could lose weight, tries to watch her diet    Past Medical History:   Diagnosis Date   • Acquired hypothyroidism    • Aortic valve sclerosis 4/26/2017    With mild stenosis, left ventricular ejection fraction of 70% and grade 1 diastolic dysfunction    • Chronic ITP (idiopathic thrombocytopenia) (HCC)     Resolved with splenectomy   • Essential hypertension, benign    • Mixed hyperlipidemia    • Palpitations    • RBBB      Past Surgical History:   Procedure Laterality Date   • SPLENECTOMY  1961    maintains immunizations with [facility]   • ABDOMINAL HYSTERECTOMY TOTAL     • TONSILLECTOMY       Family History   Problem Relation Age of Onset   • Hypertension Mother    • Thyroid Sister      Social History     Social History   • Marital status:      Spouse name: N/A   • Number of children: N/A   • Years of education: N/A     Occupational History   • Not on file.     Social History Main Topics   • Smoking status: Never Smoker   • Smokeless tobacco: Never Used   • Alcohol use Not on file   • Drug use: Unknown   • Sexual activity: Not on file     Other Topics Concern   • Not on file     Social History Narrative   • No narrative on file     Allergies   Allergen Reactions   • Aspirin    • Latex Itching     Outpatient Encounter Prescriptions as of 7/16/2018   Medication Sig Dispense Refill   • vitamin D, Ergocalciferol, (DRISDOL) 63029 UNITS Cap capsule Take  by mouth every 7 days.     •  "metoprolol (LOPRESSOR) 25 MG Tab Take 12.5 mg by mouth 2 times a day. Hold if HR<60     • LISINOPRIL PO Take 40 mg by mouth every day.     • LEVOTHYROXINE SODIUM Take 88 mcg by mouth every day.     • acetaminophen (TYLENOL) 325 MG Tab Take 650 mg by mouth every four hours as needed.     • ascorbic acid (ASCORBIC ACID) 500 MG Tab Take 500 mg by mouth every day.       No facility-administered encounter medications on file as of 7/16/2018.      Review of Systems   Constitutional: Negative for fever, malaise/fatigue and weight loss.   HENT: Negative for congestion and hearing loss.    Eyes: Negative.    Respiratory: Negative for cough, sputum production, shortness of breath and wheezing.    Cardiovascular: Negative for chest pain, leg swelling and PND.   Gastrointestinal: Negative for heartburn and nausea.   Musculoskeletal: Negative.    Neurological: Negative.  Negative for dizziness and loss of consciousness.   Endo/Heme/Allergies: Does not bruise/bleed easily.   Psychiatric/Behavioral: Negative for depression. The patient is not nervous/anxious and does not have insomnia.    All other systems reviewed and are negative.       Objective:   /58   Pulse 62   Ht 1.6 m (5' 3\")   Wt 98 kg (216 lb)   SpO2 94%   BMI 38.26 kg/m²      Physical Exam   Constitutional: She is oriented to person, place, and time. She appears well-developed and well-nourished.   overwt but healthy appearing   HENT:   Head: Normocephalic and atraumatic.   Eyes: EOM are normal. Pupils are equal, round, and reactive to light.   Neck: Neck supple. No JVD present. No thyromegaly present.   Cardiovascular: Normal rate, regular rhythm and intact distal pulses.    No murmur heard.  Pulmonary/Chest: Breath sounds normal.   Abdominal: Bowel sounds are normal. She exhibits no distension.   Musculoskeletal: She exhibits no edema or tenderness.   Lymphadenopathy:     She has no cervical adenopathy.   Neurological: She is alert and oriented to person, " place, and time. No cranial nerve deficit. She exhibits normal muscle tone. Coordination normal.   Skin: Skin is warm and dry. No rash noted.   Psychiatric: She has a normal mood and affect. Her behavior is normal.       Assessment:     1. Symptomatic PVCs     2. RBBB         Medical Decision Making:  Today's Assessment / Status / Plan:       72-year-old woman with a great attitude and tries to follow up closely.  She is genetically predispositioned to heart disease based on her native race as her cardiac risk factors that she tries to manage    Hypertension  Discussed goals.  We talked about diet and weight loss and her predisposition for diabetes  Requested annual blood work from her primary at her [facility] clinic.  Compliant with her medications     hyperlipidemia  LDL reviewed from last year when it was 141.  Talked about diet again, she has gained weight from last year  Repeat blood work with PCP, but low threshold to start medication, she discussed this with me, Crestor 10 mg even every other day may be helpful.  Discussed LDL goals based on national guidelines    Aortic sclerosis/stenosis  No murmur heard on my exam today  Doing exceedingly well  We will follow clinically    RTC annually or as needed based on the above

## 2023-11-18 ENCOUNTER — OFFICE VISIT (OUTPATIENT)
Dept: URGENT CARE | Facility: PHYSICIAN GROUP | Age: 78
End: 2023-11-18
Payer: MEDICARE

## 2023-11-18 VITALS
HEIGHT: 62 IN | DIASTOLIC BLOOD PRESSURE: 70 MMHG | RESPIRATION RATE: 20 BRPM | TEMPERATURE: 97 F | WEIGHT: 198 LBS | SYSTOLIC BLOOD PRESSURE: 166 MMHG | BODY MASS INDEX: 36.44 KG/M2 | HEART RATE: 60 BPM | OXYGEN SATURATION: 97 %

## 2023-11-18 DIAGNOSIS — R42 DIZZINESS: ICD-10-CM

## 2023-11-18 DIAGNOSIS — R03.0 ELEVATED BLOOD PRESSURE READING: ICD-10-CM

## 2023-11-18 PROCEDURE — 99202 OFFICE O/P NEW SF 15 MIN: CPT | Performed by: FAMILY MEDICINE

## 2023-11-18 PROCEDURE — 3078F DIAST BP <80 MM HG: CPT | Performed by: FAMILY MEDICINE

## 2023-11-18 PROCEDURE — 3077F SYST BP >= 140 MM HG: CPT | Performed by: FAMILY MEDICINE

## 2023-11-18 NOTE — PROGRESS NOTES
Chief Complaint:    Chief Complaint   Patient presents with    Dizziness     Per pt, started this morning, Sat., [id number].    Sinusitis     Runny nose and off/on plugged ears and ringing.    Nausea       History of Present Illness:    She was driving today, turned head to right to look at traffic and had sudden onset of dizziness. She felt nauseated when she felt dizzy. Currently she feels improved regarding dizziness, but feels like her eyes are still moving back and forth laterally and medially at times. No more nausea in exam room. She reports she has had intermittent plugged feeling in her ears for months, but denies any significant nasal symptoms. She reports a history of dizziness in the past and was given a sheet of exercises to do in the past, but she is not sure where this is.      Past Medical History:    Past Medical History:   Diagnosis Date    Acquired hypothyroidism     Aortic valve sclerosis 4/26/2017    With mild stenosis, left ventricular ejection fraction of 70% and grade 1 diastolic dysfunction     Chronic ITP (idiopathic thrombocytopenia) (HCC)     Resolved with splenectomy    Essential hypertension, benign     H/O splenectomy     Mixed hyperlipidemia     Nonrheumatic aortic valve stenosis     Palpitations     RBBB      Past Surgical History:    Past Surgical History:   Procedure Laterality Date    SPLENECTOMY  1961    maintains immunizations with [facility]    ABDOMINAL HYSTERECTOMY TOTAL      TONSILLECTOMY       Social History:    Social History     Socioeconomic History    Marital status:      Spouse name: Not on file    Number of children: Not on file    Years of education: Not on file    Highest education level: Not on file   Occupational History    Not on file   Tobacco Use    Smoking status: Never    Smokeless tobacco: Never   Substance and Sexual Activity    Alcohol use: Never    Drug use: Never    Sexual activity: Not on file   Other Topics Concern    Not on file   Social History  "Narrative    Not on file     Social Determinants of Health     Financial Resource Strain: Not on file   Food Insecurity: Not on file   Transportation Needs: Not on file   Physical Activity: Not on file   Stress: Not on file   Social Connections: Not on file   Intimate Partner Violence: Not on file   Housing Stability: Not on file     Family History:    Family History   Problem Relation Age of Onset    Hypertension Mother     Thyroid Sister      Medications:    Current Outpatient Medications on File Prior to Visit   Medication Sig Dispense Refill    Levothyroxine Sodium (SYNTHROID PO) Take 75 mcg by mouth every day.      vitamin D3 (CHOLECALCIFEROL) 400 UNIT Tab Take 1,000 Units by mouth every day.      ascorbic acid (ASCORBIC ACID) 500 MG Tab Take 500 mg by mouth every day.      LISINOPRIL PO Take 40 mg by mouth every day.       No current facility-administered medications on file prior to visit.     Allergies:    Allergies   Allergen Reactions    Aspirin     Atenolol      Bradycardia      Latex Itching    Simvastatin      GI?       Vitals:    Vitals:    11/18/23 1139   BP: (!) 166/70   BP Location: Left arm   Patient Position: Sitting   BP Cuff Size: Large adult   Pulse: 60   Resp: 20   Temp: 36.1 °C (97 °F)   TempSrc: Temporal   SpO2: 97%   Weight: 89.8 kg (198 lb)   Height: 1.575 m (5' 2\")       Physical Exam:    Constitutional: Vital signs reviewed. Appears well-developed and well-nourished. No acute distress.   Eyes: Sclera white, conjunctivae clear. PERRLA. No nystagmus.  ENT: External ears normal. External auditory canals normal without discharge. TMs translucent and non-bulging. Hearing normal. Lips are normal.   Neck: Neck supple.   Cardiovascular: Regular rate and rhythm. No murmur.  Pulmonary/Chest: Respirations non-labored. Clear to auscultation bilaterally.  Musculoskeletal: Normal gait. No muscular atrophy or weakness.  Neurological: Alert and oriented to person, place, and time. CN 2-12 intact. Muscle " tone normal. Coordination normal. Normal cerebellar exam. Negative Madyson Hallpike Maneuver bilaterally.  Skin: No rashes or lesions. Warm, dry, normal turgor.  Psychiatric: Normal mood and affect. Behavior is normal. Judgment and thought content normal.       Assessment / Plan & Medical Decision Makin. Dizziness    2. Elevated blood pressure reading       Discussed with her DDX, management options, and risks, benefits, and alternatives to treatment plan agreed upon.    She was driving today, turned head to right to look at traffic and had sudden onset of dizziness. She felt nauseated when she felt dizzy. Currently she feels improved regarding dizziness, but feels like her eyes are still moving back and forth laterally and medially at times. No more nausea in exam room. She reports she has had intermittent plugged feeling in her ears for months, but denies any significant nasal symptoms. She reports a history of dizziness in the past and was given a sheet of exercises to do in the past, but she is not sure where this is.    With exception of /70, vitals and exam are benign. She is feeling improved compared to the episode of dizziness she has this AM when she turned head to right to look at traffic. I feel she is currently stable and no immediate intervention is needed, OK to further observe.     Perhaps she had episode of Positional Vertigo this AM, but currently Chilcoot Hallpike Maneuver is negative bilaterally.    Advised if she has return of symptoms, she can watch YouTube video of ENT Joyce Andino MD Half Vasquezault Maneuver explaining this Canalith Repositioning Maneuver which she may do which may help symptoms resolve.    Advised to be seen if anything worsening, change in symptoms, or any other concerns.

## 2024-01-22 ENCOUNTER — OFFICE VISIT (OUTPATIENT)
Dept: CARDIOLOGY | Facility: PHYSICIAN GROUP | Age: 79
End: 2024-01-22
Payer: MEDICARE

## 2024-01-22 VITALS
WEIGHT: 198 LBS | SYSTOLIC BLOOD PRESSURE: 134 MMHG | BODY MASS INDEX: 36.44 KG/M2 | HEIGHT: 62 IN | DIASTOLIC BLOOD PRESSURE: 66 MMHG | HEART RATE: 93 BPM | OXYGEN SATURATION: 95 % | RESPIRATION RATE: 12 BRPM

## 2024-01-22 DIAGNOSIS — I35.0 NONRHEUMATIC AORTIC VALVE STENOSIS: Chronic | ICD-10-CM

## 2024-01-22 DIAGNOSIS — I10 PRIMARY HYPERTENSION: ICD-10-CM

## 2024-01-22 DIAGNOSIS — E78.5 DYSLIPIDEMIA: ICD-10-CM

## 2024-01-22 DIAGNOSIS — E78.2 MIXED HYPERLIPIDEMIA: ICD-10-CM

## 2024-01-22 DIAGNOSIS — I45.10 RIGHT BUNDLE BRANCH BLOCK: ICD-10-CM

## 2024-01-22 PROCEDURE — 99214 OFFICE O/P EST MOD 30 MIN: CPT | Performed by: INTERNAL MEDICINE

## 2024-01-22 PROCEDURE — 3078F DIAST BP <80 MM HG: CPT | Performed by: INTERNAL MEDICINE

## 2024-01-22 PROCEDURE — 3075F SYST BP GE 130 - 139MM HG: CPT | Performed by: INTERNAL MEDICINE

## 2024-01-22 NOTE — PATIENT INSTRUCTIONS
Please call me [phone] to get an order to do an echocardiogram (ultrasound of the heart) in 2025 and 2026    Consider Fusion Dynamic ([email or web address]/) $ DO NOT SUBSCRIBE WE WILL ALWAYS REVIEW YOUR TRACINGS ON MYCHART or Smartwatch such as Apple Watch $$$$ for monitoring of the heart palpitations.  This is a small device that syncs to your phone with Bluetooth that can tell you your heart rhythm.  You can send us a screencapture of the tracings with Saber Hacer.        Typical Patch Monitor looks like this beatlabo or [employer] [place]      Alternatively consider a pulse oximeter and let us know if Oxygen is <90 or pulse is <50 or > 110 while resting

## 2024-01-22 NOTE — PROGRESS NOTES
Chief Complaint   Patient presents with    Follow-Up     FV Dx: Benign and innocent cardiac murmurs         Subjective     Yamileth OSMAN Walton is a 78 y.o. female who presents today with mild AS and RBBB with PVCs    Doing well palpitations in the AM    Past Medical History:   Diagnosis Date    Acquired hypothyroidism     Aortic valve sclerosis 4/26/2017    With mild stenosis, left ventricular ejection fraction of 70% and grade 1 diastolic dysfunction     Chronic ITP (idiopathic thrombocytopenia) (HCC)     Resolved with splenectomy    Essential hypertension, benign     H/O splenectomy     Mixed hyperlipidemia     Nonrheumatic aortic valve stenosis     Palpitations     RBBB      Past Surgical History:   Procedure Laterality Date    SPLENECTOMY  1961    maintains immunizations with [facility]    ABDOMINAL HYSTERECTOMY TOTAL      TONSILLECTOMY       Family History   Problem Relation Age of Onset    Hypertension Mother     Thyroid Sister      Social History     Socioeconomic History    Marital status:      Spouse name: Not on file    Number of children: Not on file    Years of education: Not on file    Highest education level: Not on file   Occupational History    Not on file   Tobacco Use    Smoking status: Never    Smokeless tobacco: Never   Substance and Sexual Activity    Alcohol use: Never    Drug use: Never    Sexual activity: Not on file   Other Topics Concern    Not on file   Social History Narrative    Not on file     Social Determinants of Health     Financial Resource Strain: Not on file   Food Insecurity: Not on file   Transportation Needs: Not on file   Physical Activity: Not on file   Stress: Not on file   Social Connections: Not on file   Intimate Partner Violence: Not on file   Housing Stability: Not on file     Allergies   Allergen Reactions    Aspirin     Atenolol      Bradycardia      Latex Itching    Simvastatin      GI?     Outpatient Encounter Medications as of 1/22/2024   Medication Sig Dispense  "Refill    Levothyroxine Sodium (SYNTHROID PO) Take 75 mcg by mouth every day.      vitamin D3 (CHOLECALCIFEROL) 400 UNIT Tab Take 1,000 Units by mouth every day.      ascorbic acid (ASCORBIC ACID) 500 MG Tab Take 500 mg by mouth every day.      LISINOPRIL PO Take 40 mg by mouth every day.       No facility-administered encounter medications on file as of 1/22/2024.     ROS           Objective     /66 (BP Location: Left arm, Patient Position: Sitting, BP Cuff Size: Adult)   Pulse 93   Resp 12   Ht 1.575 m (5' 2\")   Wt 89.8 kg (198 lb)   SpO2 95%   BMI 36.21 kg/m²     Physical Exam  Constitutional:       General: She is not in acute distress.     Appearance: She is not diaphoretic.   Eyes:      General: No scleral icterus.  Neck:      Vascular: No JVD.   Cardiovascular:      Rate and Rhythm: Normal rate.      Heart sounds: Murmur heard.      No friction rub. No gallop.   Pulmonary:      Effort: No respiratory distress.      Breath sounds: No wheezing or rales.   Abdominal:      General: Bowel sounds are normal.      Palpations: Abdomen is soft.   Musculoskeletal:      Right lower leg: No edema.      Left lower leg: No edema.   Skin:     Findings: No rash.   Neurological:      Mental Status: She is alert. Mental status is at baseline.   Psychiatric:         Mood and Affect: Mood normal.          We reviewed in person the most recent labs        Assessment & Plan     1. Nonrheumatic aortic valve stenosis        2. RBBB        3. Primary hypertension        4. Mixed hyperlipidemia        5. Dyslipidemia            Medical Decision Making: Today's Assessment/Status/Plan:        It was my pleasure to meet with Ms. Walton.    We addressed the management of hypertension at today's visit. Blood pressure is well controlled.  We specifically assessed the labs on hypertension treatment    Intolerant to simvastatin    Echo in 2025 or 2026 for mild AS      I will see Ms. Walton back in 2-3 year time and encouraged " her to follow up with us over the phone or electronically using my MyChart as issues arise.    It is my pleasure to participate in the care of Ms. Walton.  Please do not hesitate to contact me with questions or concerns.    Gaudencio Hi MD PhD [facility]  Cardiologist [facility]    Please note that this dictation was created using voice recognition software. There may be errors I did not discover before finalizing the note.

## 2024-06-06 ENCOUNTER — PATIENT OUTREACH (OUTPATIENT)
Dept: ONCOLOGY | Facility: MEDICAL CENTER | Age: 79
End: 2024-06-06
Payer: MEDICARE

## 2024-06-06 NOTE — PROGRESS NOTES
Oncology Nurse Navigation  Pt states she underwent surgery with Dr. Moore in [place].  She was referred to other radiation and medical oncologists but states she was denied d/t insurance.  She is currently working with an advocate at the [facility] (Robert).  Pt scheduled for [facility] consult with Dr. Key on 6/27/24.  Pt denies transportation barriers.

## 2024-06-25 ENCOUNTER — HOSPITAL ENCOUNTER (OUTPATIENT)
Dept: RADIOLOGY | Facility: MEDICAL CENTER | Age: 79
End: 2024-06-25
Attending: FAMILY MEDICINE

## 2024-06-25 ENCOUNTER — HOSPITAL ENCOUNTER (OUTPATIENT)
Dept: RADIOLOGY | Facility: MEDICAL CENTER | Age: 79
End: 2024-06-25

## 2024-07-10 PROBLEM — Z17.0 CARCINOMA OF UPPER-OUTER QUADRANT OF RIGHT BREAST IN FEMALE, ESTROGEN RECEPTOR POSITIVE (HCC): Status: ACTIVE | Noted: 2024-07-10

## 2024-07-10 PROBLEM — C50.411 CARCINOMA OF UPPER-OUTER QUADRANT OF RIGHT BREAST IN FEMALE, ESTROGEN RECEPTOR POSITIVE (HCC): Status: ACTIVE | Noted: 2024-07-10

## 2024-07-11 ENCOUNTER — HOSPITAL ENCOUNTER (OUTPATIENT)
Dept: RADIATION ONCOLOGY | Facility: MEDICAL CENTER | Age: 79
End: 2024-07-11
Attending: RADIOLOGY
Payer: MEDICARE

## 2024-07-11 VITALS
BODY MASS INDEX: 36.67 KG/M2 | OXYGEN SATURATION: 91 % | HEART RATE: 79 BPM | DIASTOLIC BLOOD PRESSURE: 71 MMHG | HEIGHT: 62 IN | SYSTOLIC BLOOD PRESSURE: 146 MMHG | WEIGHT: 199.3 LBS

## 2024-07-11 DIAGNOSIS — Z17.0 CARCINOMA OF UPPER-OUTER QUADRANT OF RIGHT BREAST IN FEMALE, ESTROGEN RECEPTOR POSITIVE (HCC): ICD-10-CM

## 2024-07-11 DIAGNOSIS — C50.411 CARCINOMA OF UPPER-OUTER QUADRANT OF RIGHT BREAST IN FEMALE, ESTROGEN RECEPTOR POSITIVE (HCC): ICD-10-CM

## 2024-07-11 PROCEDURE — 99205 OFFICE O/P NEW HI 60 MIN: CPT | Performed by: RADIOLOGY

## 2024-07-11 PROCEDURE — 99214 OFFICE O/P EST MOD 30 MIN: CPT | Performed by: RADIOLOGY

## 2024-07-11 ASSESSMENT — PAIN SCALES - GENERAL: PAINLEVEL: 2=MINIMAL-SLIGHT

## 2024-07-17 ENCOUNTER — TELEPHONE (OUTPATIENT)
Dept: HEMATOLOGY ONCOLOGY | Facility: MEDICAL CENTER | Age: 79
End: 2024-07-17
Payer: MEDICARE

## 2024-08-01 ENCOUNTER — HOSPITAL ENCOUNTER (OUTPATIENT)
Dept: RADIATION ONCOLOGY | Facility: MEDICAL CENTER | Age: 79
End: 2024-08-01
Attending: RADIOLOGY
Payer: MEDICARE

## 2024-08-07 ENCOUNTER — PATIENT OUTREACH (OUTPATIENT)
Dept: ONCOLOGY | Facility: MEDICAL CENTER | Age: 79
End: 2024-08-07
Payer: MEDICARE

## 2024-08-07 ENCOUNTER — HOSPITAL ENCOUNTER (OUTPATIENT)
Dept: RADIATION ONCOLOGY | Facility: MEDICAL CENTER | Age: 79
End: 2024-08-07

## 2024-08-07 PROCEDURE — 77334 RADIATION TREATMENT AID(S): CPT | Mod: 26 | Performed by: RADIOLOGY

## 2024-08-07 PROCEDURE — 77263 THER RADIOLOGY TX PLNG CPLX: CPT | Performed by: RADIOLOGY

## 2024-08-07 PROCEDURE — 77334 RADIATION TREATMENT AID(S): CPT | Performed by: RADIOLOGY

## 2024-08-07 PROCEDURE — 77290 THER RAD SIMULAJ FIELD CPLX: CPT | Performed by: RADIOLOGY

## 2024-08-07 PROCEDURE — 77290 THER RAD SIMULAJ FIELD CPLX: CPT | Mod: 26 | Performed by: RADIOLOGY

## 2024-08-07 NOTE — PROGRESS NOTES
Oncology Nurse Navigation  APBI planned.  Pt lives in [place].     Phoned pt for follow up on distress screening.  No answer, left message.

## 2024-08-07 NOTE — RADIATION PLANNING NOTES
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 on 7/10/2024  Stage prefix: Initial diagnosis  Histologic grading system: 3 grade system       DATE OF SERVICE: 8/7/2024    TYPE OF SIMULATION: Breast       [x] Right    [] Left      GOAL OF TREATMENT:   [x] Curative  [] Palliative  [] Oligometastatic    COMPLEX:  [x] Complex Blocking   []Arcs  [] Custom Blocks  [] >3 Sites    PROCEDURE: Patient placed in supine position on wing board with VAC NYLA bag with appropriate slant to compensate for slope of chest wall.  Breast/chest wall borders marked CT scan obtained to contain entire volume of interest.      I have personally reviewed the relevant data, performed the target localization, and determined all relevant factors for this patient’s simulation.

## 2024-08-07 NOTE — RADIATION PLANNING NOTES
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 on 7/10/2024  Stage prefix: Initial diagnosis  Histologic grading system: 3 grade system         IMAGING REVIEWED:  [x] CT     [] MRI     [] PET/CT     [] BONE SCAN     [x] MAMMO     [] OTHER      TREATMENT INTENT:   [x] CURATIVE     [] MAINTENANCE     []  PALLIATIVE      []  SUPPORTIVE     []  PROPHYLACTIC     [] BENIGN     []  CONSOLIDATIVE      [] DEFINITIVE   []  OLOGIMETASTATIC      LINE OF TREATMENT:  [x] ADJUVANT   [] DEFINITIVE   [] NEOADJUVANT   [] RE-TREATMENT      TECHNIQUE PLANNED:  [] IMRT   [x] 3D   [] SBRT   [] SRS/SRT   [] HDR   [] ELECTRON       IMRT JUSTIFICATION:  []   An immediately adjacent area has been previously irradiated and abutting portals must be established with high precision.    []  Dose escalation is planned to deliver radiation doses in excess of those commonly utilized for similar tumors with conventional treatment.    []  The target volume is concave or convex, and the critical normal tissues are within or around that convexity or concavity.    []  The target volume is in close proximity to critical structures that must be protected.    []  The volume of interest must be covered with narrow margins to adequately protect  immediately adjacent structures.      FIELDS & BLOCKING:  [x] COMPLEX BLOCKS     []  = 3 TX AREAS     []  ARCS     []  CUSTOM SHEILD        []  SIMPLE BLOCK      CHEMOTHERAPY:  []  CONCURRENT     []  INDUCTION     [] SEQUENTIAL     []  <30 DAYS FROM XRT      NOTES:    OAR CONSTRAINTS: (GUIDELINES ONLY NOT ABSOLUTE)    Target Prescribed Coverage   PTV 95% of PTV covered by 95% (cGy) of RX Dose       SALLY Goal   Max point dose PTV Eval <=110%    Contralateral Breast V5% < 2Gy   Ipsilateral Lung V15% < 20Gy   Ipsilateral Lung V35% < 10Gy   Ipsilateral Lung V50% < 5Gy   Contralateral Lung V10% < 5Gy   Heart (L Sided) V5% < 20Gy   *RTOG 1005, START-A, START-B

## 2024-08-12 PROCEDURE — 77295 3-D RADIOTHERAPY PLAN: CPT | Mod: 26 | Performed by: RADIOLOGY

## 2024-08-12 PROCEDURE — 77334 RADIATION TREATMENT AID(S): CPT | Performed by: RADIOLOGY

## 2024-08-12 PROCEDURE — 77300 RADIATION THERAPY DOSE PLAN: CPT | Performed by: RADIOLOGY

## 2024-08-12 PROCEDURE — 77334 RADIATION TREATMENT AID(S): CPT | Mod: 26 | Performed by: RADIOLOGY

## 2024-08-12 PROCEDURE — 77295 3-D RADIOTHERAPY PLAN: CPT | Performed by: RADIOLOGY

## 2024-08-12 PROCEDURE — 77300 RADIATION THERAPY DOSE PLAN: CPT | Mod: 26 | Performed by: RADIOLOGY

## 2024-08-14 ENCOUNTER — HOSPITAL ENCOUNTER (OUTPATIENT)
Dept: RADIATION ONCOLOGY | Facility: MEDICAL CENTER | Age: 79
End: 2024-08-14

## 2024-08-14 ENCOUNTER — HOSPITAL ENCOUNTER (OUTPATIENT)
Dept: RADIATION ONCOLOGY | Facility: MEDICAL CENTER | Age: 79
End: 2024-08-14
Attending: RADIOLOGY
Payer: MEDICARE

## 2024-08-14 VITALS
BODY MASS INDEX: 36.61 KG/M2 | WEIGHT: 200.18 LBS | SYSTOLIC BLOOD PRESSURE: 143 MMHG | DIASTOLIC BLOOD PRESSURE: 71 MMHG | OXYGEN SATURATION: 96 % | HEART RATE: 79 BPM

## 2024-08-14 LAB
CHEMOTHERAPY INFUSION START DATE: NORMAL
CHEMOTHERAPY RECORDS: 3000
CHEMOTHERAPY RECORDS: 6
CHEMOTHERAPY RECORDS: NORMAL
CHEMOTHERAPY RX CANCER: NORMAL
DATE 1ST CHEMO CANCER: NORMAL
RAD ONC ARIA COURSE LAST TREATMENT DATE: NORMAL
RAD ONC ARIA COURSE TREATMENT ELAPSED DAYS: NORMAL
RAD ONC ARIA REFERENCE POINT DOSAGE GIVEN TO DATE: 6
RAD ONC ARIA REFERENCE POINT ID: NORMAL
RAD ONC ARIA REFERENCE POINT SESSION DOSAGE GIVEN: 6

## 2024-08-14 PROCEDURE — 77387 GUIDANCE FOR RADJ TX DLVR: CPT | Performed by: RADIOLOGY

## 2024-08-14 PROCEDURE — 77412 RADIATION TX DELIVERY LVL 3: CPT | Performed by: RADIOLOGY

## 2024-08-14 PROCEDURE — 77417 THER RADIOLOGY PORT IMAGE(S): CPT | Performed by: RADIOLOGY

## 2024-08-14 PROCEDURE — 77280 THER RAD SIMULAJ FIELD SMPL: CPT | Mod: 26 | Performed by: RADIOLOGY

## 2024-08-14 PROCEDURE — 77280 THER RAD SIMULAJ FIELD SMPL: CPT | Performed by: RADIOLOGY

## 2024-08-14 ASSESSMENT — PAIN SCALES - GENERAL: PAINLEVEL: NO PAIN

## 2024-08-14 NOTE — ON TREATMENT VISIT
"  ON TREATMENT NOTE  RADIATION ONCOLOGY DEPARTMENT    Patient name:  Yamileth BREWER [facility]    Primary Physician:  Gaudencio Sandhu M.D. MRN: [id number]  CSN: [id number]   Referring physician:  Clarke Cook M.D. : 1945, 78 y.o.     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 on 7/10/2024  Stage prefix: Initial diagnosis  Histologic grading system: 3 grade system      TREATMENT SUMMARY:  Aria Treatment Information          2024   Aria Course Treatment Dates   Course First Treatment Date 2024    Course Last Treatment Date 2024    Aria Treatment Summary   R Breast APBI  Plan from Course [id number]   Fraction 1 of 5   Elapsed Course Days 0 @ [id number]   Prescribed Fraction Dose 600 cGy   Prescribed Total Dose 3,000 cGy   R Breast APBI  Reference Point from Course [id number]   Elapsed Course Days 0 @ [id number]   Session Dose 600 cGy   Total Dose 600 cGy      Radiation Treatments       Active   Plans   R Breast APBI   Most recent treatment: Dose planned: 600 cGy (fraction 1 of 5 on 2024)   Total: Dose planned: 3,000 cGy   Elapsed Days: 0 @ [id number]           Historical   No historical radiation treatments to show.               SUBJECTIVE:   Patient tolerated her first day well.  She does not have any questions regarding her radiation.    VITAL SIGNS:    Encounter Vitals  Blood Pressure : (!) 143/71 (\"Probably I am excited, I've never been through this before.\")  Pulse: 79  Pulse Oximetry: 96 %  Weight: 90.8 kg (200 lb 2.8 oz)  Weight Source: Stand Up Scale  Pain Score: No pain      2024    10:53 AM 2024     8:39 AM   Pain Assessment   Pain Score NO PAIN 2=MINIMAL PA   Pain Loc  BREAST          PHYSICAL EXAM:  No " erythema    TOXICITY      8/14/2024    10:56 AM   Toxicity Assessment   Toxicity Assessment Breast   Fatigue (lethargy, malaise, asthenia) None   Fever (in the absence of neutropenia) None   Radiation Dermatitis None   Lymphatics Normal   RT - Pain due to RT None   Dyspnea Normal         IMPRESSION:  Cancer Staging   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 on 7/10/2024      PLAN:  No change in treatment plan    Disposition:  Treatment plan reviewed. Questions answered. Continue therapy outlined.     Jefferson Key M.D.    No orders of the defined types were placed in this encounter.

## 2024-08-14 NOTE — CT SIMULATION
DATE OF SERVICE: 8/14/2024    Radiation Therapy Episodes       Active Episodes       Radiation Therapy: 3D CRT                   Radiation Treatments         Plan Last Treated On Elapsed Days Fractions Treated Prescribed Fraction Dose (cGy) Prescribed Total Dose (cGy)    R Breast APBI 8/14/2024 0 @ [id number] 1 of 5 600 3,000                  Reference Point Last Treated On Elapsed Days Most Recent Session Dose (cGy) Total Dose (cGy)    R Breast APBI 8/14/2024 0 @ [id number] 600 600                            First Visit Simple Simulation: Called by [employer] machine to verify treatment parameters including:  treatment site, treatment dose, and treatment setup prior to first treatment. Image derived shifts reviewed in all appropriate planes.  Shifts approved.  Patient treated.    I have personally reviewed the relevant data, performed the target localization, and determined all relevant factors for this patient’s simulation.

## 2024-08-15 ENCOUNTER — HOSPITAL ENCOUNTER (OUTPATIENT)
Dept: RADIATION ONCOLOGY | Facility: MEDICAL CENTER | Age: 79
End: 2024-08-15
Payer: MEDICARE

## 2024-08-15 LAB
CHEMOTHERAPY INFUSION START DATE: NORMAL
CHEMOTHERAPY RECORDS: 3000
CHEMOTHERAPY RECORDS: 6
CHEMOTHERAPY RECORDS: NORMAL
CHEMOTHERAPY RX CANCER: NORMAL
DATE 1ST CHEMO CANCER: NORMAL
RAD ONC ARIA COURSE LAST TREATMENT DATE: NORMAL
RAD ONC ARIA COURSE TREATMENT ELAPSED DAYS: NORMAL
RAD ONC ARIA REFERENCE POINT DOSAGE GIVEN TO DATE: 12
RAD ONC ARIA REFERENCE POINT ID: NORMAL
RAD ONC ARIA REFERENCE POINT SESSION DOSAGE GIVEN: 6

## 2024-08-15 PROCEDURE — 77014 PR CT GUIDANCE PLACEMENT RAD THERAPY FIELDS: CPT | Mod: 26 | Performed by: RADIOLOGY

## 2024-08-15 PROCEDURE — 77387 GUIDANCE FOR RADJ TX DLVR: CPT | Performed by: RADIOLOGY

## 2024-08-15 PROCEDURE — 77412 RADIATION TX DELIVERY LVL 3: CPT | Performed by: RADIOLOGY

## 2024-08-16 ENCOUNTER — HOSPITAL ENCOUNTER (OUTPATIENT)
Dept: RADIATION ONCOLOGY | Facility: MEDICAL CENTER | Age: 79
End: 2024-08-16
Payer: MEDICARE

## 2024-08-16 LAB
CHEMOTHERAPY INFUSION START DATE: NORMAL
CHEMOTHERAPY RECORDS: 3000
CHEMOTHERAPY RECORDS: 6
CHEMOTHERAPY RECORDS: NORMAL
CHEMOTHERAPY RX CANCER: NORMAL
DATE 1ST CHEMO CANCER: NORMAL
RAD ONC ARIA COURSE LAST TREATMENT DATE: NORMAL
RAD ONC ARIA COURSE TREATMENT ELAPSED DAYS: NORMAL
RAD ONC ARIA REFERENCE POINT DOSAGE GIVEN TO DATE: 18
RAD ONC ARIA REFERENCE POINT ID: NORMAL
RAD ONC ARIA REFERENCE POINT SESSION DOSAGE GIVEN: 6

## 2024-08-16 PROCEDURE — 77014 PR CT GUIDANCE PLACEMENT RAD THERAPY FIELDS: CPT | Mod: 26 | Performed by: RADIOLOGY

## 2024-08-16 PROCEDURE — 77336 RADIATION PHYSICS CONSULT: CPT | Performed by: RADIOLOGY

## 2024-08-16 PROCEDURE — 77412 RADIATION TX DELIVERY LVL 3: CPT | Performed by: RADIOLOGY

## 2024-08-16 PROCEDURE — 77387 GUIDANCE FOR RADJ TX DLVR: CPT | Performed by: RADIOLOGY

## 2024-08-19 ENCOUNTER — HOSPITAL ENCOUNTER (OUTPATIENT)
Dept: RADIATION ONCOLOGY | Facility: MEDICAL CENTER | Age: 79
End: 2024-08-19
Payer: MEDICARE

## 2024-08-19 LAB
CHEMOTHERAPY INFUSION START DATE: NORMAL
CHEMOTHERAPY RECORDS: 3000
CHEMOTHERAPY RECORDS: 6
CHEMOTHERAPY RECORDS: NORMAL
CHEMOTHERAPY RX CANCER: NORMAL
DATE 1ST CHEMO CANCER: NORMAL
RAD ONC ARIA COURSE LAST TREATMENT DATE: NORMAL
RAD ONC ARIA COURSE TREATMENT ELAPSED DAYS: NORMAL
RAD ONC ARIA REFERENCE POINT DOSAGE GIVEN TO DATE: 24
RAD ONC ARIA REFERENCE POINT ID: NORMAL
RAD ONC ARIA REFERENCE POINT SESSION DOSAGE GIVEN: 6

## 2024-08-19 PROCEDURE — 77014 PR CT GUIDANCE PLACEMENT RAD THERAPY FIELDS: CPT | Mod: 26 | Performed by: RADIOLOGY

## 2024-08-19 PROCEDURE — 77387 GUIDANCE FOR RADJ TX DLVR: CPT | Performed by: RADIOLOGY

## 2024-08-19 PROCEDURE — 77412 RADIATION TX DELIVERY LVL 3: CPT | Performed by: RADIOLOGY

## 2024-08-20 ENCOUNTER — HOSPITAL ENCOUNTER (OUTPATIENT)
Dept: RADIATION ONCOLOGY | Facility: MEDICAL CENTER | Age: 79
End: 2024-08-20
Payer: MEDICARE

## 2024-08-20 LAB
CHEMOTHERAPY INFUSION START DATE: NORMAL
CHEMOTHERAPY INFUSION START DATE: NORMAL
CHEMOTHERAPY INFUSION STOP DATE: NORMAL
CHEMOTHERAPY RECORDS: 3000
CHEMOTHERAPY RECORDS: 3000
CHEMOTHERAPY RECORDS: 6
CHEMOTHERAPY RECORDS: 6
CHEMOTHERAPY RECORDS: NORMAL
CHEMOTHERAPY RX CANCER: NORMAL
CHEMOTHERAPY RX CANCER: NORMAL
DATE 1ST CHEMO CANCER: NORMAL
DATE 1ST CHEMO CANCER: NORMAL
RAD ONC ARIA COURSE LAST TREATMENT DATE: NORMAL
RAD ONC ARIA COURSE LAST TREATMENT DATE: NORMAL
RAD ONC ARIA COURSE TREATMENT ELAPSED DAYS: NORMAL
RAD ONC ARIA COURSE TREATMENT ELAPSED DAYS: NORMAL
RAD ONC ARIA REFERENCE POINT DOSAGE GIVEN TO DATE: 30
RAD ONC ARIA REFERENCE POINT DOSAGE GIVEN TO DATE: 30
RAD ONC ARIA REFERENCE POINT ID: NORMAL
RAD ONC ARIA REFERENCE POINT ID: NORMAL
RAD ONC ARIA REFERENCE POINT SESSION DOSAGE GIVEN: 6

## 2024-08-20 PROCEDURE — 77412 RADIATION TX DELIVERY LVL 3: CPT | Performed by: RADIOLOGY

## 2024-08-20 PROCEDURE — 77427 RADIATION TX MANAGEMENT X5: CPT | Performed by: RADIOLOGY

## 2024-08-20 PROCEDURE — 77387 GUIDANCE FOR RADJ TX DLVR: CPT | Performed by: RADIOLOGY

## 2024-08-20 PROCEDURE — 77014 PR CT GUIDANCE PLACEMENT RAD THERAPY FIELDS: CPT | Mod: 26 | Performed by: RADIOLOGY

## 2024-08-20 NOTE — RADIATION COMPLETION NOTES
"  END OF TREATMENT SUMMARY    Patient name:  Yamileth BREWER [facility]    Primary Physician:  Gaudencio Sandhu M.D. MRN: [id number]  CSN: [id number]   Referring physician:  Dr. Cook : 1945, 78 y.o.       TREATMENT SUMMARY:        Course First Treatment Date 2024    Course Last Treatment Date 2024   Course Elapsed Days 6 @    Course Intent Curative     Radiation Therapy Episodes       Active Episodes       Radiation Therapy: 3D CRT                   Radiation Treatments         Plan Last Treated On Elapsed Days Fractions Treated Prescribed Fraction Dose (cGy) Prescribed Total Dose (cGy)    R Breast APBI 2024 6 @ [id number] 5 of 5 600 3,000                  Reference Point Last Treated On Elapsed Days Most Recent Session Dose (cGy) Total Dose (cGy)    R Breast APBI 2024 6 @  600 3,000                                     STAGE:   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 on 7/10/2024  Stage prefix: Initial diagnosis  Histologic grading system: 3 grade system       TREATMENT INDICATION:   ***     CONCURRENT SYSTEMIC TREATMENT:   ***     RT COURSE DISCONTINUED EARLY:   No     PATIENT EXPERIENCE:       2024    10:56 AM   Toxicity Assessment   Toxicity Assessment Breast   Fatigue (lethargy, malaise, asthenia) None   Fever (in the absence of neutropenia) None   Radiation Dermatitis None   Lymphatics Normal   RT - Pain due to RT None   Dyspnea Normal        FOLLOW-UP PLAN:   {avpfollowuplist:06240::\"8 Weeks\"}     COMMENT:          ANATOMIC TARGET SUMMARY    ANATOMIC TARGET MODALITY TECHNIQUE   ***   {RAD ONC MODALITY:61111} {RAD ONC Technique:89389::\"IMRT\"}            COMMENT:         DIAGRAMS:      DOSE VOLUME HISTOGRAMS:              "

## 2024-08-27 ENCOUNTER — HOSPITAL ENCOUNTER (OUTPATIENT)
Dept: HEMATOLOGY ONCOLOGY | Facility: MEDICAL CENTER | Age: 79
End: 2024-08-27
Attending: INTERNAL MEDICINE
Payer: MEDICARE

## 2024-08-27 VITALS
WEIGHT: 199.74 LBS | SYSTOLIC BLOOD PRESSURE: 104 MMHG | HEIGHT: 62 IN | TEMPERATURE: 98.5 F | HEART RATE: 102 BPM | OXYGEN SATURATION: 92 % | DIASTOLIC BLOOD PRESSURE: 58 MMHG | BODY MASS INDEX: 36.76 KG/M2

## 2024-08-27 DIAGNOSIS — C50.411 CARCINOMA OF UPPER-OUTER QUADRANT OF RIGHT BREAST IN FEMALE, ESTROGEN RECEPTOR POSITIVE (HCC): ICD-10-CM

## 2024-08-27 DIAGNOSIS — Z17.0 CARCINOMA OF UPPER-OUTER QUADRANT OF RIGHT BREAST IN FEMALE, ESTROGEN RECEPTOR POSITIVE (HCC): ICD-10-CM

## 2024-08-27 PROCEDURE — 99212 OFFICE O/P EST SF 10 MIN: CPT | Performed by: INTERNAL MEDICINE

## 2024-08-27 ASSESSMENT — ENCOUNTER SYMPTOMS
CONSTITUTIONAL NEGATIVE: 1
MUSCULOSKELETAL NEGATIVE: 1
GASTROINTESTINAL NEGATIVE: 1
PSYCHIATRIC NEGATIVE: 1
CARDIOVASCULAR NEGATIVE: 1
NEUROLOGICAL NEGATIVE: 1
EYES NEGATIVE: 1
RESPIRATORY NEGATIVE: 1

## 2024-08-27 ASSESSMENT — PAIN SCALES - GENERAL: PAINLEVEL: 8=MODERATE-SEVERE PAIN

## 2024-08-27 NOTE — PROGRESS NOTES
Consult:  Hematology/Oncology      Referring Physician: Jefferson Key MD  Primary Care:  Gaudencio Sandhu M.D.    Diagnosis: HR positive HER2 negative right breast cancer    Chief Complaint: HR positive HER2 negative right breast cancer    History of Presenting Illness:  Yamileth Walton is a 79 y.o.  female who self detected a mass in her right breast.  She had a mammogram and ultrasound in [place] which showed a 2 cm spiculated mass in the upper outer quadrant.  Ultrasound-guided biopsy demonstrated an invasive ductal carcinoma, grade 2, ER positive greater than 90%, PA +30 to 40%, HER2/chris negative with Ki-67 of 5 to 10%.  She saw Dr. Cook in underwent a partial breast resection and sentinel lymph node biopsy on 7/10/2024.  Pathology demonstrated 2.6 cm invasive ductal carcinoma, grade 2, with 9 negative lymph nodes.  An incidental papilloma was detected.  She saw Dr. Durham who did accelerated partial breast radiation in 5 fractions.  She tolerated it well.  She does not have hot flashes or night sweats.  No family history of breast cancer.  Health is good for age.      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, estrogen receptor positive (HCC)     Chronic ITP (idiopathic thrombocytopenia) (HCC)     Resolved with splenectomy    Essential hypertension, benign     H/O splenectomy     Mixed hyperlipidemia     Nonrheumatic aortic valve stenosis     Palpitations     RBBB        Past Surgical History:   Procedure Laterality Date    MASTECTOMY Right 07/01/2024    PARTIAL    NODE BIOPSY SENTINEL Right 07/01/2024    SPLENECTOMY  01/01/1961    maintains immunizations with [facility]    ABDOMINAL HYSTERECTOMY TOTAL      TONSILLECTOMY         Social History     Tobacco Use    Smoking  "status: Former     Current packs/day: 0.00     Average packs/day: 1 pack/day for 5.0 years (5.0 ttl pk-yrs)     Types: Cigarettes     Start date:      Quit date:      Years since quittin.6    Smokeless tobacco: Never    Tobacco comments:     I smoked from age [age], 1 ppd   Vaping Use    Vaping status: Never Used   Substance Use Topics    Alcohol use: Never    Drug use: Never        Family History   Problem Relation Age of Onset    Hypertension Mother     Thyroid Sister        Allergies as of 2024 - Reviewed 2024   Allergen Reaction Noted    Aspirin      Atenolol      Latex Itching 2018    Simvastatin      Sulfa drugs Unspecified 2024         Current Outpatient Medications:     artificial tears (EYE LUBRICANT) Ointment ophth ointment, Apply 1 Application to both eyes every 8 hours., Disp: , Rfl:     Levothyroxine Sodium (SYNTHROID PO), Take 75 mcg by mouth every day., Disp: , Rfl:     vitamin D3 (CHOLECALCIFEROL) 400 UNIT Tab, Take 1,000 Units by mouth every day., Disp: , Rfl:     ascorbic acid (ASCORBIC ACID) 500 MG Tab, Take 500 mg by mouth every day., Disp: , Rfl:     LISINOPRIL PO, Take 40 mg by mouth every day., Disp: , Rfl:     Review of Systems:  Review of Systems   Constitutional: Negative.    HENT: Negative.     Eyes: Negative.    Respiratory: Negative.     Cardiovascular: Negative.    Gastrointestinal: Negative.    Genitourinary: Negative.    Musculoskeletal: Negative.    Skin: Negative.    Neurological: Negative.    Endo/Heme/Allergies: Negative.    Psychiatric/Behavioral: Negative.            Physical Exam:  Vitals:    24 1358   BP: 104/58   BP Location: Left arm   Patient Position: Sitting   Pulse: (!) 102   Temp: 36.9 °C (98.5 °F)   TempSrc: Temporal   SpO2: 92%   Weight: 90.6 kg (199 lb 11.8 oz)   Height: 1.575 m (5' 2\")       DESC; KARNOFSKY SCALE WITH ECOG EQUIVALENT: 90, Able to carry on normal activity; minor signs or symptoms of disease (ECOG equivalent " 0)    DISTRESS LEVEL: no acute distress    Physical Exam  Constitutional:       Appearance: Normal appearance.   HENT:      Head: Normocephalic.      Mouth/Throat:      Mouth: Mucous membranes are moist.      Pharynx: Oropharynx is clear.   Eyes:      Extraocular Movements: Extraocular movements intact.      Conjunctiva/sclera: Conjunctivae normal.      Pupils: Pupils are equal, round, and reactive to light.   Cardiovascular:      Rate and Rhythm: Normal rate and regular rhythm.      Pulses: Normal pulses.   Pulmonary:      Effort: Pulmonary effort is normal.      Breath sounds: Normal breath sounds.   Chest:      Comments: Good cosmetic result in the right breast at the lumpectomy and radiation therapy with 1+ hyperpigmentation and 1+ radiation-induced peau d'orange.  No tenderness is noted.  The right axillary area is negative.  Left breast is without masses nipple discharge or tenderness.  Abdominal:      General: Abdomen is flat. Bowel sounds are normal.      Palpations: Abdomen is soft. There is no mass.   Musculoskeletal:         General: Normal range of motion.   Skin:     General: Skin is warm.   Neurological:      General: No focal deficit present.      Mental Status: She is alert and oriented to person, place, and time.   Psychiatric:         Mood and Affect: Mood normal.         Behavior: Behavior normal.            Labs:  Hospital Outpatient Visit on 08/20/2024   Component Date Value Ref Range Status    Course ID 08/20/2024 [email or web address]   Final    Course Start Date 08/20/2024 [id number]   Final    Course First Treatment Date 08/20/2024 08/14/2024   Final    Course Last Treatment Date 08/20/2024 08/20/2024   Final    Course Elapsed Days 08/20/2024 6 @ [id number]   Final    Course Intent 08/20/2024 Curative   Final    RP ID 08/20/2024 R Breast APBI   Final    RP Dosage Given to Date (Gy) 08/20/2024 30   Final    RP Session Dosage Given (Gy) 08/20/2024 6   Final    Plan ID 08/20/2024 R Breast APBI    Final    Plan Name 08/20/2024 R Breast APBI   Final    Plan Fractions Treated to Date 08/20/2024 5 of 5   Final    Plan Prescribed Dose Per Fraction * 08/20/2024 6   Final    Plan Total Prescribed Dose (cGy) 08/20/2024 3,000   Final    Course ID 08/20/2024 [email or web address]   Final    Course Start Date 08/20/2024 [id number]   Final    Course End Date 08/20/2024 [id number]   Final    Course First Treatment Date 08/20/2024 08/14/2024   Final    Course Last Treatment Date 08/20/2024 08/20/2024   Final    Course Elapsed Days 08/20/2024 6 @ [id number]   Final    Course Intent 08/20/2024 Curative   Final    RP ID 08/20/2024 R Breast APBI   Final    RP Dosage Given to Date (Gy) 08/20/2024 30   Final    Plan ID 08/20/2024 R Breast APBI   Final    Plan Name 08/20/2024 R Breast APBI   Final    Plan Fractions Treated to Date 08/20/2024 5 of 5   Final    Plan Prescribed Dose Per Fraction * 08/20/2024 6   Final    Plan Total Prescribed Dose (cGy) 08/20/2024 3,000   Final   Hospital Outpatient Visit on 08/19/2024   Component Date Value Ref Range Status    Course ID 08/19/2024 [email or web address]   Final    Course Start Date 08/19/2024 [id number]   Final    Course First Treatment Date 08/19/2024 08/14/2024   Final    Course Last Treatment Date 08/19/2024 08/19/2024   Final    Course Elapsed Days 08/19/2024 5 @ [id number]   Final    Course Intent 08/19/2024 Curative   Final    RP ID 08/19/2024 R Breast APBI   Final    RP Dosage Given to Date (Gy) 08/19/2024 24   Final    RP Session Dosage Given (Gy) 08/19/2024 6   Final    Plan ID 08/19/2024 R Breast APBI   Final    Plan Name 08/19/2024 R Breast APBI   Final    Plan Fractions Treated to Date 08/19/2024 4 of 5   Final    Plan Prescribed Dose Per Fraction * 08/19/2024 6   Final    Plan Total Prescribed Dose (cGy) 08/19/2024 3,000   Final   Hospital Outpatient Visit on 08/16/2024   Component Date Value Ref Range Status    Course ID 08/16/2024 [email or web address]   Final    Course  Start Date 08/16/2024 [id number]   Final    Course First Treatment Date 08/16/2024 08/14/2024   Final    Course Last Treatment Date 08/16/2024 08/16/2024   Final    Course Elapsed Days 08/16/2024 2 @ [id number]   Final    Course Intent 08/16/2024 Curative   Final    RP ID 08/16/2024 R Breast APBI   Final    RP Dosage Given to Date (Gy) 08/16/2024 18   Final    RP Session Dosage Given (Gy) 08/16/2024 6   Final    Plan ID 08/16/2024 R Breast APBI   Final    Plan Name 08/16/2024 R Breast APBI   Final    Plan Fractions Treated to Date 08/16/2024 3 of 5   Final    Plan Prescribed Dose Per Fraction * 08/16/2024 6   Final    Plan Total Prescribed Dose (cGy) 08/16/2024 3,000   Final   Hospital Outpatient Visit on 08/15/2024   Component Date Value Ref Range Status    Course ID 08/15/2024 [email or web address]   Final    Course Start Date 08/15/2024 [id number]   Final    Course First Treatment Date 08/15/2024 08/14/2024   Final    Course Last Treatment Date 08/15/2024 08/15/2024   Final    Course Elapsed Days 08/15/2024 1 @ [id number]   Final    Course Intent 08/15/2024 Curative   Final    RP ID 08/15/2024 R Breast APBI   Final    RP Dosage Given to Date (Gy) 08/15/2024 12   Final    RP Session Dosage Given (Gy) 08/15/2024 6   Final    Plan ID 08/15/2024 R Breast APBI   Final    Plan Name 08/15/2024 R Breast APBI   Final    Plan Fractions Treated to Date 08/15/2024 2 of 5   Final    Plan Prescribed Dose Per Fraction * 08/15/2024 6   Final    Plan Total Prescribed Dose (cGy) 08/15/2024 3,000   Final   Orders Only on 08/14/2024   Component Date Value Ref Range Status    Course ID 08/14/2024 [email or web address]   Final    Course Start Date 08/14/2024 [id number]   Final    Course First Treatment Date 08/14/2024 08/14/2024   Final    Course Last Treatment Date 08/14/2024 08/14/2024   Final    Course Elapsed Days 08/14/2024 0 @ [id number]   Final    Course Intent 08/14/2024 Curative   Final    RP ID 08/14/2024 R Breast APBI    Final    RP Dosage Given to Date (Gy) 08/14/2024 6   Final    RP Session Dosage Given (Gy) 08/14/2024 6   Final    Plan ID 08/14/2024 R Breast APBI   Final    Plan Name 08/14/2024 R Breast APBI   Final    Plan Fractions Treated to Date 08/14/2024 1 of 5   Final    Plan Prescribed Dose Per Fraction * 08/14/2024 6   Final    Plan Total Prescribed Dose (cGy) 08/14/2024 3,000   Final       Imaging:   All listed images below have been independently reviewed by me. I agree with the findings as summarized below:    No results found.     Pathology:      Assessment & Plan:  1.  Invasive ductal carcinoma the right breast, grade 2, stage IIa clinically (pT2, PN 0) ER positive greater than 90%, NV +30 to 40%, HER2 negative, Ki-67 5 to 10%.  Status post partial mastectomy sentinel lymph node biopsy and accelerated partial breast irradiation.  She is a good candidate for endocrine therapy with anastrozole.       Plan: Start anastrozole.  I will see her back in 2 months to assess her initial tolerance of therapy.    Any questions and concerns raised by the patient were answered to the best of my ability. Thank you for allowing me to participate in the care for this patient. Please feel free to contact me for any questions or concerns.     Danish Sullivan M.D.

## 2024-08-27 NOTE — ADDENDUM NOTE
Encounter addended by: Leo Nassar, Med Ass't on: 8/27/2024 4:11 PM   Actions taken: Charge Capture section accepted

## 2024-08-28 DIAGNOSIS — C50.411 CARCINOMA OF UPPER-OUTER QUADRANT OF RIGHT BREAST IN FEMALE, ESTROGEN RECEPTOR POSITIVE (HCC): ICD-10-CM

## 2024-08-28 DIAGNOSIS — Z17.0 CARCINOMA OF UPPER-OUTER QUADRANT OF RIGHT BREAST IN FEMALE, ESTROGEN RECEPTOR POSITIVE (HCC): ICD-10-CM

## 2024-08-28 RX ORDER — ANASTROZOLE 1 MG/1
1 TABLET ORAL DAILY
Qty: 90 TABLET | Refills: 1 | Status: SHIPPED | OUTPATIENT
Start: 2024-08-28

## 2024-10-07 ENCOUNTER — HOSPITAL ENCOUNTER (OUTPATIENT)
Dept: RADIATION ONCOLOGY | Facility: MEDICAL CENTER | Age: 79
End: 2024-10-07
Attending: RADIOLOGY
Payer: MEDICARE

## 2025-02-13 ENCOUNTER — TELEPHONE (OUTPATIENT)
Dept: HEMATOLOGY ONCOLOGY | Facility: MEDICAL CENTER | Age: 80
End: 2025-02-13
Payer: MEDICARE

## 2025-02-13 NOTE — TELEPHONE ENCOUNTER
PAR attempted to contact patient, but the voicemail box was full.  (Both phone numbers)     This patient now has [employer] plan.  ([facility] is not contracted, the patient will need to sign an out of network insurance agreement if she wants to be seen)

## 2025-02-19 ENCOUNTER — APPOINTMENT (OUTPATIENT)
Dept: HEMATOLOGY ONCOLOGY | Facility: MEDICAL CENTER | Age: 80
End: 2025-02-19
Attending: INTERNAL MEDICINE
Payer: MEDICARE

## 2025-03-11 DIAGNOSIS — Z17.0 CARCINOMA OF UPPER-OUTER QUADRANT OF RIGHT BREAST IN FEMALE, ESTROGEN RECEPTOR POSITIVE (HCC): ICD-10-CM

## 2025-03-11 DIAGNOSIS — C50.411 CARCINOMA OF UPPER-OUTER QUADRANT OF RIGHT BREAST IN FEMALE, ESTROGEN RECEPTOR POSITIVE (HCC): ICD-10-CM

## 2025-03-11 RX ORDER — ANASTROZOLE 1 MG/1
1 TABLET ORAL DAILY
Qty: 90 TABLET | Refills: 1 | Status: SHIPPED | OUTPATIENT
Start: 2025-03-11

## 2025-03-31 ENCOUNTER — TELEPHONE (OUTPATIENT)
Dept: HEMATOLOGY ONCOLOGY | Facility: MEDICAL CENTER | Age: 80
End: 2025-03-31
Payer: MEDICARE

## 2025-04-01 NOTE — TELEPHONE ENCOUNTER
VIRA for patient from medical oncology. Patient had called the office requesting to schedule an appointment with Dr. Sullivan. Office phone number provided for call back.

## 2025-04-15 ENCOUNTER — HOSPITAL ENCOUNTER (OUTPATIENT)
Dept: HEMATOLOGY ONCOLOGY | Facility: MEDICAL CENTER | Age: 80
End: 2025-04-15
Attending: INTERNAL MEDICINE
Payer: MEDICARE

## 2025-04-15 VITALS
DIASTOLIC BLOOD PRESSURE: 62 MMHG | WEIGHT: 201.28 LBS | TEMPERATURE: 98.4 F | BODY MASS INDEX: 37.04 KG/M2 | SYSTOLIC BLOOD PRESSURE: 146 MMHG | OXYGEN SATURATION: 94 % | HEIGHT: 62 IN | HEART RATE: 93 BPM

## 2025-04-15 DIAGNOSIS — C50.411 CARCINOMA OF UPPER-OUTER QUADRANT OF RIGHT BREAST IN FEMALE, ESTROGEN RECEPTOR POSITIVE (HCC): ICD-10-CM

## 2025-04-15 DIAGNOSIS — Z17.0 CARCINOMA OF UPPER-OUTER QUADRANT OF RIGHT BREAST IN FEMALE, ESTROGEN RECEPTOR POSITIVE (HCC): ICD-10-CM

## 2025-04-15 PROCEDURE — 99213 OFFICE O/P EST LOW 20 MIN: CPT | Performed by: INTERNAL MEDICINE

## 2025-04-15 PROCEDURE — 99212 OFFICE O/P EST SF 10 MIN: CPT | Performed by: INTERNAL MEDICINE

## 2025-04-15 ASSESSMENT — ENCOUNTER SYMPTOMS
EYES NEGATIVE: 1
GASTROINTESTINAL NEGATIVE: 1
RESPIRATORY NEGATIVE: 1
CONSTITUTIONAL NEGATIVE: 1
PSYCHIATRIC NEGATIVE: 1
CARDIOVASCULAR NEGATIVE: 1
MUSCULOSKELETAL NEGATIVE: 1
NEUROLOGICAL NEGATIVE: 1

## 2025-04-15 ASSESSMENT — PAIN SCALES - GENERAL: PAINLEVEL_OUTOF10: NO PAIN

## 2025-04-15 NOTE — PROGRESS NOTES
Medical oncology follow-up visit: 4/15/2025      Referring Physician: Jefferson Key MD  Primary Care:  Gaudencio Sandhu M.D.    Diagnosis: HR positive HER2 negative right breast cancer    Chief Complaint: HR positive HER2 negative right breast cancer    History of Presenting Illness:  Yamileth Walton is a 79 y.o.  female who self detected a mass in her right breast.  She had a mammogram and ultrasound in [place] which showed a 2 cm spiculated mass in the upper outer quadrant.  Ultrasound-guided biopsy demonstrated an invasive ductal carcinoma, grade 2, ER positive greater than 90%, MN +30 to 40%, HER2/chris negative with Ki-67 of 5 to 10%.  She saw Dr. Cook in underwent a partial breast resection and sentinel lymph node biopsy on 7/10/2024.  Pathology demonstrated 2.6 cm invasive ductal carcinoma, grade 2, with 9 negative lymph nodes.  An incidental papilloma was detected.  She saw Dr. Key who did accelerated partial breast radiation in 5 fractions.  She tolerated it well.  She does not have hot flashes or night sweats.  No family history of breast cancer.  Health is good for age.    Interval history 4/15/2025: She started anastrozole in August 2024.  She had trouble getting a ride here from [place] so has discontinued her medication.  She has had no problems whatsoever including no hot flashes or night sweats or musculoskeletal symptoms.  She has no problems with her breasts and no symptoms referable to metastatic breast cancer.      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, estrogen receptor positive (HCC)     Chronic ITP (idiopathic thrombocytopenia) (HCC)     Resolved with splenectomy    Essential hypertension, benign     H/O splenectomy     Mixed hyperlipidemia      Nonrheumatic aortic valve stenosis     Palpitations     RBBB        Past Surgical History:   Procedure Laterality Date    MASTECTOMY Right 2024    PARTIAL    NODE BIOPSY SENTINEL Right 2024    SPLENECTOMY  1961    maintains immunizations with [facility]    ABDOMINAL HYSTERECTOMY TOTAL      TONSILLECTOMY         Social History     Tobacco Use    Smoking status: Former     Current packs/day: 0.00     Average packs/day: 1 pack/day for 5.0 years (5.0 ttl pk-yrs)     Types: Cigarettes     Start date:      Quit date:      Years since quittin.3    Smokeless tobacco: Never    Tobacco comments:     I smoked from age [age], 1 ppd   Vaping Use    Vaping status: Never Used   Substance Use Topics    Alcohol use: Never    Drug use: Never        Family History   Problem Relation Age of Onset    Hypertension Mother     Thyroid Sister        Allergies as of 04/15/2025 - Reviewed 2024   Allergen Reaction Noted    Aspirin      Atenolol      Latex Itching 2018    Simvastatin      Sulfa drugs Unspecified 2024         Current Outpatient Medications:     anastrozole (ARIMIDEX) 1 MG Tab, Take 1 Tablet by mouth every day., Disp: 90 Tablet, Rfl: 1    artificial tears (EYE LUBRICANT) Ointment ophth ointment, Apply 1 Application to both eyes every 8 hours., Disp: , Rfl:     Levothyroxine Sodium (SYNTHROID PO), Take 75 mcg by mouth every day., Disp: , Rfl:     vitamin D3 (CHOLECALCIFEROL) 400 UNIT Tab, Take 1,000 Units by mouth every day., Disp: , Rfl:     ascorbic acid (ASCORBIC ACID) 500 MG Tab, Take 500 mg by mouth every day., Disp: , Rfl:     LISINOPRIL PO, Take 40 mg by mouth every day., Disp: , Rfl:     Review of Systems:  Review of Systems   Constitutional: Negative.    HENT: Negative.     Eyes: Negative.    Respiratory: Negative.     Cardiovascular: Negative.    Gastrointestinal: Negative.    Genitourinary: Negative.    Musculoskeletal: Negative.    Skin: Negative.    Neurological: Negative.     Endo/Heme/Allergies: Negative.    Psychiatric/Behavioral: Negative.            Physical Exam:  There were no vitals filed for this visit.      DESC; KARNOFSKY SCALE WITH ECOG EQUIVALENT: 90, Able to carry on normal activity; minor signs or symptoms of disease (ECOG equivalent 0)    DISTRESS LEVEL: no acute distress    Physical Exam  Constitutional:       Appearance: Normal appearance.   HENT:      Head: Normocephalic.      Mouth/Throat:      Mouth: Mucous membranes are moist.      Pharynx: Oropharynx is clear.   Eyes:      Extraocular Movements: Extraocular movements intact.      Conjunctiva/sclera: Conjunctivae normal.      Pupils: Pupils are equal, round, and reactive to light.   Cardiovascular:      Rate and Rhythm: Normal rate and regular rhythm.      Pulses: Normal pulses.   Pulmonary:      Effort: Pulmonary effort is normal.      Breath sounds: Normal breath sounds.   Chest:      Comments: 4/15/2025: Good cosmetic result in the right breast with resolution of hyperpigmentation and peau d'orange.  Both breasts are without masses nipple discharge or tenderness.  Good cosmetic result in the right breast at the lumpectomy and radiation therapy with 1+ hyperpigmentation and 1+ radiation-induced peau d'orange.  No tenderness is noted.  The right axillary area is negative.  Left breast is without masses nipple discharge or tenderness.  Abdominal:      General: Abdomen is flat. Bowel sounds are normal.      Palpations: Abdomen is soft. There is no mass.   Musculoskeletal:         General: Normal range of motion.   Skin:     General: Skin is warm.   Neurological:      General: No focal deficit present.      Mental Status: She is alert and oriented to person, place, and time.   Psychiatric:         Mood and Affect: Mood normal.         Behavior: Behavior normal.            Labs:  Hospital Outpatient Visit on 08/20/2024   Component Date Value Ref Range Status    Course ID 08/20/2024 [email or web address]   Final    Course  Start Date 08/20/2024 [id number]   Final    Course First Treatment Date 08/20/2024 08/14/2024   Final    Course Last Treatment Date 08/20/2024 08/20/2024   Final    Course Elapsed Days 08/20/2024 6 @ [id number]   Final    Course Intent 08/20/2024 Curative   Final    RP ID 08/20/2024 R Breast APBI   Final    RP Dosage Given to Date (Gy) 08/20/2024 30   Final    RP Session Dosage Given (Gy) 08/20/2024 6   Final    Plan ID 08/20/2024 R Breast APBI   Final    Plan Name 08/20/2024 R Breast APBI   Final    Plan Fractions Treated to Date 08/20/2024 5 of 5   Final    Plan Prescribed Dose Per Fraction * 08/20/2024 6   Final    Plan Total Prescribed Dose (cGy) 08/20/2024 3,000   Final    Course ID 08/20/2024 [email or web address]   Final    Course Start Date 08/20/2024 [id number]   Final    Course End Date 08/20/2024 [id number]   Final    Course First Treatment Date 08/20/2024 08/14/2024   Final    Course Last Treatment Date 08/20/2024 08/20/2024   Final    Course Elapsed Days 08/20/2024 6 @ [id number]   Final    Course Intent 08/20/2024 Curative   Final    RP ID 08/20/2024 R Breast APBI   Final    RP Dosage Given to Date (Gy) 08/20/2024 30   Final    Plan ID 08/20/2024 R Breast APBI   Final    Plan Name 08/20/2024 R Breast APBI   Final    Plan Fractions Treated to Date 08/20/2024 5 of 5   Final    Plan Prescribed Dose Per Fraction * 08/20/2024 6   Final    Plan Total Prescribed Dose (cGy) 08/20/2024 3,000   Final   Hospital Outpatient Visit on 08/19/2024   Component Date Value Ref Range Status    Course ID 08/19/2024 [email or web address]   Final    Course Start Date 08/19/2024 [id number]   Final    Course First Treatment Date 08/19/2024 08/14/2024   Final    Course Last Treatment Date 08/19/2024 08/19/2024   Final    Course Elapsed Days 08/19/2024 5 @ [id number]   Final    Course Intent 08/19/2024 Curative   Final    RP ID 08/19/2024 R Breast APBI   Final    RP Dosage Given to Date (Gy) 08/19/2024 24   Final    RP  Session Dosage Given (Gy) 08/19/2024 6   Final    Plan ID 08/19/2024 R Breast APBI   Final    Plan Name 08/19/2024 R Breast APBI   Final    Plan Fractions Treated to Date 08/19/2024 4 of 5   Final    Plan Prescribed Dose Per Fraction * 08/19/2024 6   Final    Plan Total Prescribed Dose (cGy) 08/19/2024 3,000   Final   Hospital Outpatient Visit on 08/16/2024   Component Date Value Ref Range Status    Course ID 08/16/2024 [email or web address]   Final    Course Start Date 08/16/2024 [id number]   Final    Course First Treatment Date 08/16/2024 08/14/2024   Final    Course Last Treatment Date 08/16/2024 08/16/2024   Final    Course Elapsed Days 08/16/2024 2 @ [id number]   Final    Course Intent 08/16/2024 Curative   Final    RP ID 08/16/2024 R Breast APBI   Final    RP Dosage Given to Date (Gy) 08/16/2024 18   Final    RP Session Dosage Given (Gy) 08/16/2024 6   Final    Plan ID 08/16/2024 R Breast APBI   Final    Plan Name 08/16/2024 R Breast APBI   Final    Plan Fractions Treated to Date 08/16/2024 3 of 5   Final    Plan Prescribed Dose Per Fraction * 08/16/2024 6   Final    Plan Total Prescribed Dose (cGy) 08/16/2024 3,000   Final   Hospital Outpatient Visit on 08/15/2024   Component Date Value Ref Range Status    Course ID 08/15/2024 [email or web address]   Final    Course Start Date 08/15/2024 [id number]   Final    Course First Treatment Date 08/15/2024 08/14/2024   Final    Course Last Treatment Date 08/15/2024 08/15/2024   Final    Course Elapsed Days 08/15/2024 1 @ [id number]   Final    Course Intent 08/15/2024 Curative   Final    RP ID 08/15/2024 R Breast APBI   Final    RP Dosage Given to Date (Gy) 08/15/2024 12   Final    RP Session Dosage Given (Gy) 08/15/2024 6   Final    Plan ID 08/15/2024 R Breast APBI   Final    Plan Name 08/15/2024 R Breast APBI   Final    Plan Fractions Treated to Date 08/15/2024 2 of 5   Final    Plan Prescribed Dose Per Fraction * 08/15/2024 6   Final    Plan Total Prescribed Dose  (cGy) 08/15/2024 3,000   Final   Orders Only on 08/14/2024   Component Date Value Ref Range Status    Course ID 08/14/2024 [email or web address]   Final    Course Start Date 08/14/2024 [id number]   Final    Course First Treatment Date 08/14/2024 08/14/2024   Final    Course Last Treatment Date 08/14/2024 08/14/2024   Final    Course Elapsed Days 08/14/2024 0 @ [id number]   Final    Course Intent 08/14/2024 Curative   Final    RP ID 08/14/2024 R Breast APBI   Final    RP Dosage Given to Date (Gy) 08/14/2024 6   Final    RP Session Dosage Given (Gy) 08/14/2024 6   Final    Plan ID 08/14/2024 R Breast APBI   Final    Plan Name 08/14/2024 R Breast APBI   Final    Plan Fractions Treated to Date 08/14/2024 1 of 5   Final    Plan Prescribed Dose Per Fraction * 08/14/2024 6   Final    Plan Total Prescribed Dose (cGy) 08/14/2024 3,000   Final       Imaging:   All listed images below have been independently reviewed by me. I agree with the findings as summarized below:    No results found.     Pathology:      Assessment & Plan:  1.  Invasive ductal carcinoma the right breast, grade 2, stage IIa clinically (pT2, PN 0) ER positive greater than 90%, MD +30 to 40%, HER2 negative, Ki-67 5 to 10%.  Status post partial mastectomy sentinel lymph node biopsy and accelerated partial breast irradiation.  On anastrozole since August 2024 with good tolerance.    Plan: Continue anastrozole.  I will see her back in 6 months for routine follow-up.  We will get a yearly bilateral screening mammogram in [place] in July.  Any questions and concerns raised by the patient were answered to the best of my ability. Thank you for allowing me to participate in the care for this patient. Please feel free to contact me for any questions or concerns.     Danish Sullivan M.D.

## 2025-04-15 NOTE — ADDENDUM NOTE
Encounter addended by: Leo Nassar, Med Ass't on: 4/15/2025 1:51 PM   Actions taken: Charge Capture section accepted

## 2025-05-14 ENCOUNTER — TELEPHONE (OUTPATIENT)
Dept: HEMATOLOGY ONCOLOGY | Facility: MEDICAL CENTER | Age: 80
End: 2025-05-14
Payer: MEDICARE

## 2025-05-14 DIAGNOSIS — Z17.0 CARCINOMA OF UPPER-OUTER QUADRANT OF RIGHT BREAST IN FEMALE, ESTROGEN RECEPTOR POSITIVE (HCC): ICD-10-CM

## 2025-05-14 DIAGNOSIS — C50.411 CARCINOMA OF UPPER-OUTER QUADRANT OF RIGHT BREAST IN FEMALE, ESTROGEN RECEPTOR POSITIVE (HCC): ICD-10-CM

## 2025-05-14 RX ORDER — ANASTROZOLE 1 MG/1
1 TABLET ORAL DAILY
Qty: 90 TABLET | Refills: 1 | Status: SHIPPED | OUTPATIENT
Start: 2025-05-14

## 2025-05-14 NOTE — TELEPHONE ENCOUNTER
Contacted [facility] in regard to patient prescription, Anastrozole. Verbal order given to Pedro Luis, pharmacist, for Anastrozole 1mg tablet, take 1 tablet by mouth daily, qty 30 with 5 refills per Dr. Sullivan. Pedro Luis confirmed medication, dosing, quantity, and refills. No other questions or concerns at this time.

## 2025-06-27 NOTE — DISCHARGE INSTRUCTIONS
Rest, keep your shoulder in the sling.  Follow-up with orthopedic doctor.  Return for pain swelling or other concerns.    No driving on [place].   None

## 2025-06-30 ENCOUNTER — APPOINTMENT (OUTPATIENT)
Dept: URBAN - METROPOLITAN AREA CLINIC 31 | Facility: CLINIC | Age: 80
Setting detail: DERMATOLOGY
End: 2025-06-30

## 2025-06-30 DIAGNOSIS — L57.8 OTHER SKIN CHANGES DUE TO CHRONIC EXPOSURE TO NONIONIZING RADIATION: ICD-10-CM

## 2025-06-30 DIAGNOSIS — D485 NEOPLASM OF UNCERTAIN BEHAVIOR OF SKIN: ICD-10-CM

## 2025-06-30 DIAGNOSIS — L81.4 OTHER MELANIN HYPERPIGMENTATION: ICD-10-CM

## 2025-06-30 DIAGNOSIS — D18.0 HEMANGIOMA: ICD-10-CM

## 2025-06-30 DIAGNOSIS — L82.1 OTHER SEBORRHEIC KERATOSIS: ICD-10-CM

## 2025-06-30 DIAGNOSIS — L82.0 INFLAMED SEBORRHEIC KERATOSIS: ICD-10-CM

## 2025-06-30 DIAGNOSIS — L90.5 SCAR CONDITIONS AND FIBROSIS OF SKIN: ICD-10-CM

## 2025-06-30 PROBLEM — D48.5 NEOPLASM OF UNCERTAIN BEHAVIOR OF SKIN: Status: ACTIVE | Noted: 2025-06-30

## 2025-06-30 PROBLEM — D18.01 HEMANGIOMA OF SKIN AND SUBCUTANEOUS TISSUE: Status: ACTIVE | Noted: 2025-06-30

## 2025-06-30 PROCEDURE — ? LIQUID NITROGEN

## 2025-06-30 PROCEDURE — ? BIOPSY BY SHAVE METHOD

## 2025-06-30 PROCEDURE — ? SUNSCREEN RECOMMENDATIONS

## 2025-06-30 PROCEDURE — ? COUNSELING

## 2025-06-30 ASSESSMENT — LOCATION DETAILED DESCRIPTION DERM
LOCATION DETAILED: RIGHT INFERIOR MEDIAL UPPER BACK
LOCATION DETAILED: RIGHT RADIAL DORSAL HAND
LOCATION DETAILED: MEDIAL FRONTAL SCALP
LOCATION DETAILED: NASAL SUPRATIP
LOCATION DETAILED: EPIGASTRIC SKIN
LOCATION DETAILED: LEFT RADIAL DORSAL HAND
LOCATION DETAILED: LEFT SUPERIOR MEDIAL FOREHEAD
LOCATION DETAILED: INFERIOR MID FOREHEAD
LOCATION DETAILED: RIGHT CENTRAL FRONTAL SCALP
LOCATION DETAILED: INFERIOR THORACIC SPINE
LOCATION DETAILED: RIGHT SUPERIOR MEDIAL MIDBACK
LOCATION DETAILED: RIGHT LATERAL DISTAL CALF

## 2025-06-30 ASSESSMENT — LOCATION SIMPLE DESCRIPTION DERM
LOCATION SIMPLE: INFERIOR FOREHEAD
LOCATION SIMPLE: SCALP
LOCATION SIMPLE: UPPER BACK
LOCATION SIMPLE: RIGHT LOWER LEG
LOCATION SIMPLE: NOSE
LOCATION SIMPLE: RIGHT UPPER BACK
LOCATION SIMPLE: RIGHT LOWER BACK
LOCATION SIMPLE: ABDOMEN
LOCATION SIMPLE: LEFT FOREHEAD
LOCATION SIMPLE: FRONTAL SCALP
LOCATION SIMPLE: LEFT HAND
LOCATION SIMPLE: RIGHT HAND

## 2025-06-30 ASSESSMENT — LOCATION ZONE DERM
LOCATION ZONE: LEG
LOCATION ZONE: TRUNK
LOCATION ZONE: FACE
LOCATION ZONE: HAND
LOCATION ZONE: NOSE
LOCATION ZONE: SCALP

## 2025-06-30 NOTE — PROCEDURE: LIQUID NITROGEN
Show Aperture Variable?: Yes
Spray Paint Text: The liquid nitrogen was applied to the skin utilizing a spray paint frosting technique.
Medical Necessity Clause: This procedure was medically necessary because the lesions that were treated were:
Spray Paint Technique: No
Medical Necessity Information: It is in your best interest to select a reason for this procedure from the list below. All of these items fulfill various CMS LCD requirements except the new and changing color options.
Consent: The patient's consent was obtained including but not limited to risks of crusting, scabbing, blistering, scarring, darker or lighter pigmentary change, recurrence, incomplete removal and infection.
Detail Level: Detailed
Application Tool (Optional): Cry-AC
Post-Care Instructions: I reviewed with the patient in detail post-care instructions. Patient is to wear sunprotection, and avoid picking at any of the treated lesions. Pt may apply Vaseline to crusted or scabbing areas.
Number Of Freeze-Thaw Cycles: 2 freeze-thaw cycles
Duration Of Freeze Thaw-Cycle (Seconds): 3

## 2025-08-15 ENCOUNTER — HOSPITAL ENCOUNTER (OUTPATIENT)
Dept: RADIOLOGY | Facility: MEDICAL CENTER | Age: 80
End: 2025-08-15
Payer: MEDICARE

## 2025-08-15 ENCOUNTER — APPOINTMENT (OUTPATIENT)
Dept: RADIOLOGY | Facility: MEDICAL CENTER | Age: 80
End: 2025-08-15
Attending: EMERGENCY MEDICINE
Payer: MEDICARE

## 2025-08-15 ENCOUNTER — HOSPITAL ENCOUNTER (OUTPATIENT)
Facility: MEDICAL CENTER | Age: 80
End: 2025-08-17
Attending: EMERGENCY MEDICINE | Admitting: STUDENT IN AN ORGANIZED HEALTH CARE EDUCATION/TRAINING PROGRAM
Payer: MEDICARE

## 2025-08-15 DIAGNOSIS — E03.9 ACQUIRED HYPOTHYROIDISM: ICD-10-CM

## 2025-08-15 DIAGNOSIS — H81.10 BENIGN PAROXYSMAL POSITIONAL VERTIGO, UNSPECIFIED LATERALITY: ICD-10-CM

## 2025-08-15 DIAGNOSIS — I35.0 NONRHEUMATIC AORTIC VALVE STENOSIS: Chronic | ICD-10-CM

## 2025-08-15 DIAGNOSIS — E66.9 OBESITY (BMI 30-39.9): Chronic | ICD-10-CM

## 2025-08-15 DIAGNOSIS — E83.52 HYPERCALCEMIA: ICD-10-CM

## 2025-08-15 DIAGNOSIS — R42 DISEQUILIBRIUM: Primary | ICD-10-CM

## 2025-08-15 DIAGNOSIS — R29.90 STROKE-LIKE SYMPTOMS: ICD-10-CM

## 2025-08-15 LAB
ALBUMIN SERPL BCP-MCNC: 3.8 G/DL (ref 3.2–4.9)
ALBUMIN/GLOB SERPL: 1.1 G/DL
ALP SERPL-CCNC: 102 U/L (ref 30–99)
ALT SERPL-CCNC: 11 U/L (ref 2–50)
ANION GAP SERPL CALC-SCNC: 9 MMOL/L (ref 7–16)
AST SERPL-CCNC: 22 U/L (ref 12–45)
BASOPHILS # BLD AUTO: 0.6 % (ref 0–1.8)
BASOPHILS # BLD: 0.05 K/UL (ref 0–0.12)
BILIRUB SERPL-MCNC: 0.3 MG/DL (ref 0.1–1.5)
BUN SERPL-MCNC: 14 MG/DL (ref 8–22)
CALCIUM ALBUM COR SERPL-MCNC: 11.5 MG/DL (ref 8.5–10.5)
CALCIUM SERPL-MCNC: 11.3 MG/DL (ref 8.5–10.5)
CHLORIDE SERPL-SCNC: 105 MMOL/L (ref 96–112)
CO2 SERPL-SCNC: 24 MMOL/L (ref 20–33)
CREAT SERPL-MCNC: 0.9 MG/DL (ref 0.5–1.4)
EOSINOPHIL # BLD AUTO: 0.02 K/UL (ref 0–0.51)
EOSINOPHIL NFR BLD: 0.2 % (ref 0–6.9)
ERYTHROCYTE [DISTWIDTH] IN BLOOD BY AUTOMATED COUNT: 46.5 FL (ref 35.9–50)
GFR SERPLBLD CREATININE-BSD FMLA CKD-EPI: 65 ML/MIN/1.73 M 2
GLOBULIN SER CALC-MCNC: 3.5 G/DL (ref 1.9–3.5)
GLUCOSE SERPL-MCNC: 162 MG/DL (ref 65–99)
HCT VFR BLD AUTO: 40.3 % (ref 37–47)
HGB BLD-MCNC: 13.1 G/DL (ref 12–16)
IMM GRANULOCYTES # BLD AUTO: 0.02 K/UL (ref 0–0.11)
IMM GRANULOCYTES NFR BLD AUTO: 0.2 % (ref 0–0.9)
LACTATE SERPL-SCNC: 1.4 MMOL/L (ref 0.5–2)
LYMPHOCYTES # BLD AUTO: 1.08 K/UL (ref 1–4.8)
LYMPHOCYTES NFR BLD: 13.5 % (ref 22–41)
MAGNESIUM SERPL-MCNC: 1.7 MG/DL (ref 1.5–2.5)
MCH RBC QN AUTO: 30.5 PG (ref 27–33)
MCHC RBC AUTO-ENTMCNC: 32.5 G/DL (ref 32.2–35.5)
MCV RBC AUTO: 93.7 FL (ref 81.4–97.8)
MONOCYTES # BLD AUTO: 0.19 K/UL (ref 0–0.85)
MONOCYTES NFR BLD AUTO: 2.4 % (ref 0–13.4)
NEUTROPHILS # BLD AUTO: 6.66 K/UL (ref 1.82–7.42)
NEUTROPHILS NFR BLD: 83.1 % (ref 44–72)
NRBC # BLD AUTO: 0 K/UL
NRBC BLD-RTO: 0 /100 WBC (ref 0–0.2)
PLATELET # BLD AUTO: 234 K/UL (ref 164–446)
PMV BLD AUTO: 11.1 FL (ref 9–12.9)
POTASSIUM SERPL-SCNC: 4.2 MMOL/L (ref 3.6–5.5)
PROT SERPL-MCNC: 7.3 G/DL (ref 6–8.2)
RBC # BLD AUTO: 4.3 M/UL (ref 4.2–5.4)
SODIUM SERPL-SCNC: 138 MMOL/L (ref 135–145)
TROPONIN T SERPL-MCNC: 17 NG/L (ref 6–19)
WBC # BLD AUTO: 8 K/UL (ref 4.8–10.8)

## 2025-08-15 PROCEDURE — 93005 ELECTROCARDIOGRAM TRACING: CPT | Mod: TC | Performed by: EMERGENCY MEDICINE

## 2025-08-15 PROCEDURE — 99285 EMERGENCY DEPT VISIT HI MDM: CPT

## 2025-08-15 PROCEDURE — 36415 COLL VENOUS BLD VENIPUNCTURE: CPT

## 2025-08-15 PROCEDURE — 83605 ASSAY OF LACTIC ACID: CPT

## 2025-08-15 PROCEDURE — 80053 COMPREHEN METABOLIC PANEL: CPT

## 2025-08-15 PROCEDURE — 83735 ASSAY OF MAGNESIUM: CPT

## 2025-08-15 PROCEDURE — 85025 COMPLETE CBC W/AUTO DIFF WBC: CPT

## 2025-08-15 PROCEDURE — 84443 ASSAY THYROID STIM HORMONE: CPT

## 2025-08-15 PROCEDURE — 84484 ASSAY OF TROPONIN QUANT: CPT

## 2025-08-16 ENCOUNTER — APPOINTMENT (OUTPATIENT)
Dept: RADIOLOGY | Facility: MEDICAL CENTER | Age: 80
End: 2025-08-16
Payer: MEDICARE

## 2025-08-16 PROBLEM — H81.10 BPV (BENIGN POSITIONAL VERTIGO): Status: ACTIVE | Noted: 2025-08-16

## 2025-08-16 PROBLEM — R29.90 STROKE-LIKE SYMPTOMS: Status: ACTIVE | Noted: 2025-08-16

## 2025-08-16 PROBLEM — E83.52 HYPERCALCEMIA: Status: ACTIVE | Noted: 2025-08-16

## 2025-08-16 LAB
EKG IMPRESSION: NORMAL
EST. AVERAGE GLUCOSE BLD GHB EST-MCNC: 131 MG/DL
HBA1C MFR BLD: 6.2 % (ref 4–5.6)
TSH SERPL DL<=0.005 MIU/L-ACNC: 1.77 UIU/ML (ref 0.38–5.33)

## 2025-08-16 PROCEDURE — 83036 HEMOGLOBIN GLYCOSYLATED A1C: CPT

## 2025-08-16 PROCEDURE — 700105 HCHG RX REV CODE 258: Performed by: EMERGENCY MEDICINE

## 2025-08-16 PROCEDURE — 97163 PT EVAL HIGH COMPLEX 45 MIN: CPT

## 2025-08-16 PROCEDURE — 700111 HCHG RX REV CODE 636 W/ 250 OVERRIDE (IP): Mod: JZ

## 2025-08-16 PROCEDURE — 97165 OT EVAL LOW COMPLEX 30 MIN: CPT

## 2025-08-16 PROCEDURE — 700117 HCHG RX CONTRAST REV CODE 255: Performed by: EMERGENCY MEDICINE

## 2025-08-16 PROCEDURE — 700102 HCHG RX REV CODE 250 W/ 637 OVERRIDE(OP)

## 2025-08-16 PROCEDURE — 99223 1ST HOSP IP/OBS HIGH 75: CPT | Mod: GC | Performed by: STUDENT IN AN ORGANIZED HEALTH CARE EDUCATION/TRAINING PROGRAM

## 2025-08-16 PROCEDURE — G0378 HOSPITAL OBSERVATION PER HR: HCPCS

## 2025-08-16 PROCEDURE — 36415 COLL VENOUS BLD VENIPUNCTURE: CPT

## 2025-08-16 PROCEDURE — 70498 CT ANGIOGRAPHY NECK: CPT

## 2025-08-16 PROCEDURE — 96372 THER/PROPH/DIAG INJ SC/IM: CPT | Mod: XU

## 2025-08-16 PROCEDURE — 700105 HCHG RX REV CODE 258

## 2025-08-16 PROCEDURE — 70496 CT ANGIOGRAPHY HEAD: CPT

## 2025-08-16 PROCEDURE — A9270 NON-COVERED ITEM OR SERVICE: HCPCS

## 2025-08-16 RX ORDER — LISINOPRIL 20 MG/1
40 TABLET ORAL DAILY
Status: DISCONTINUED | OUTPATIENT
Start: 2025-08-16 | End: 2025-08-17 | Stop reason: HOSPADM

## 2025-08-16 RX ORDER — ENOXAPARIN SODIUM 100 MG/ML
40 INJECTION SUBCUTANEOUS DAILY
Status: DISCONTINUED | OUTPATIENT
Start: 2025-08-16 | End: 2025-08-17 | Stop reason: HOSPADM

## 2025-08-16 RX ORDER — ONDANSETRON 4 MG/1
4 TABLET, ORALLY DISINTEGRATING ORAL EVERY 4 HOURS PRN
Status: DISCONTINUED | OUTPATIENT
Start: 2025-08-16 | End: 2025-08-17 | Stop reason: HOSPADM

## 2025-08-16 RX ORDER — SODIUM CHLORIDE 9 MG/ML
1000 INJECTION, SOLUTION INTRAVENOUS ONCE
Status: COMPLETED | OUTPATIENT
Start: 2025-08-16 | End: 2025-08-16

## 2025-08-16 RX ORDER — FLUTICASONE PROPIONATE 50 MCG
1 SPRAY, SUSPENSION (ML) NASAL DAILY
COMMUNITY

## 2025-08-16 RX ORDER — LORATADINE 10 MG/1
10 TABLET ORAL DAILY
COMMUNITY
Start: 2024-04-11

## 2025-08-16 RX ORDER — AMOXICILLIN 250 MG
2 CAPSULE ORAL EVERY EVENING
Status: DISCONTINUED | OUTPATIENT
Start: 2025-08-16 | End: 2025-08-17 | Stop reason: HOSPADM

## 2025-08-16 RX ORDER — HYDRALAZINE HYDROCHLORIDE 20 MG/ML
10 INJECTION INTRAMUSCULAR; INTRAVENOUS EVERY 4 HOURS PRN
Status: DISCONTINUED | OUTPATIENT
Start: 2025-08-16 | End: 2025-08-17 | Stop reason: HOSPADM

## 2025-08-16 RX ORDER — ONDANSETRON 2 MG/ML
4 INJECTION INTRAMUSCULAR; INTRAVENOUS EVERY 4 HOURS PRN
Status: DISCONTINUED | OUTPATIENT
Start: 2025-08-16 | End: 2025-08-17 | Stop reason: HOSPADM

## 2025-08-16 RX ORDER — ACETAMINOPHEN 325 MG/1
650 TABLET ORAL EVERY 6 HOURS PRN
Status: DISCONTINUED | OUTPATIENT
Start: 2025-08-16 | End: 2025-08-17 | Stop reason: HOSPADM

## 2025-08-16 RX ORDER — POLYETHYLENE GLYCOL 3350 17 G/17G
1 POWDER, FOR SOLUTION ORAL
Status: DISCONTINUED | OUTPATIENT
Start: 2025-08-16 | End: 2025-08-17 | Stop reason: HOSPADM

## 2025-08-16 RX ORDER — SODIUM CHLORIDE 9 MG/ML
INJECTION, SOLUTION INTRAVENOUS CONTINUOUS
Status: DISCONTINUED | OUTPATIENT
Start: 2025-08-16 | End: 2025-08-17 | Stop reason: HOSPADM

## 2025-08-16 RX ORDER — LEVOTHYROXINE SODIUM 75 UG/1
75 TABLET ORAL DAILY
Status: DISCONTINUED | OUTPATIENT
Start: 2025-08-16 | End: 2025-08-17 | Stop reason: HOSPADM

## 2025-08-16 RX ADMIN — LEVOTHYROXINE SODIUM 75 MCG: 0.07 TABLET ORAL at 06:07

## 2025-08-16 RX ADMIN — IOHEXOL 60 ML: 350 INJECTION, SOLUTION INTRAVENOUS at 02:00

## 2025-08-16 RX ADMIN — LISINOPRIL 40 MG: 20 TABLET ORAL at 06:07

## 2025-08-16 RX ADMIN — SODIUM CHLORIDE: 9 INJECTION, SOLUTION INTRAVENOUS at 05:57

## 2025-08-16 RX ADMIN — SODIUM CHLORIDE 1000 ML: 9 INJECTION, SOLUTION INTRAVENOUS at 03:26

## 2025-08-16 RX ADMIN — ENOXAPARIN SODIUM 40 MG: 100 INJECTION SUBCUTANEOUS at 17:10

## 2025-08-16 ASSESSMENT — COGNITIVE AND FUNCTIONAL STATUS - GENERAL
STANDING UP FROM CHAIR USING ARMS: A LITTLE
TOILETING: A LITTLE
CLIMB 3 TO 5 STEPS WITH RAILING: A LITTLE
DAILY ACTIVITIY SCORE: 23
WALKING IN HOSPITAL ROOM: A LITTLE
SUGGESTED CMS G CODE MODIFIER MOBILITY: CH
DAILY ACTIVITIY SCORE: 24
SUGGESTED CMS G CODE MODIFIER DAILY ACTIVITY: CI
SUGGESTED CMS G CODE MODIFIER MOBILITY: CJ
MOBILITY SCORE: 21
MOBILITY SCORE: 24
SUGGESTED CMS G CODE MODIFIER DAILY ACTIVITY: CH

## 2025-08-16 ASSESSMENT — ENCOUNTER SYMPTOMS
VOMITING: 0
HEADACHES: 0
MYALGIAS: 0
FEVER: 0
SPEECH CHANGE: 0
BLURRED VISION: 0
ABDOMINAL PAIN: 0
TINGLING: 0
NAUSEA: 0
SEIZURES: 0
FOCAL WEAKNESS: 0
DOUBLE VISION: 0
DIZZINESS: 1
LOSS OF CONSCIOUSNESS: 0
NECK PAIN: 0
HEARTBURN: 0
WEAKNESS: 0
ORTHOPNEA: 0
PALPITATIONS: 0
HEMOPTYSIS: 0
CHILLS: 0
SPUTUM PRODUCTION: 0
COUGH: 0

## 2025-08-16 ASSESSMENT — PATIENT HEALTH QUESTIONNAIRE - PHQ9
SUM OF ALL RESPONSES TO PHQ9 QUESTIONS 1 AND 2: 0
2. FEELING DOWN, DEPRESSED, IRRITABLE, OR HOPELESS: NOT AT ALL
1. LITTLE INTEREST OR PLEASURE IN DOING THINGS: NOT AT ALL

## 2025-08-16 ASSESSMENT — FIBROSIS 4 INDEX
FIB4 SCORE: 2.24
FIB4 SCORE: 2.24

## 2025-08-16 ASSESSMENT — LIFESTYLE VARIABLES
AUDIT-C TOTAL SCORE: 0
TOTAL SCORE: 0
DOES PATIENT WANT TO STOP DRINKING: NO
ON A TYPICAL DAY WHEN YOU DRINK ALCOHOL HOW MANY DRINKS DO YOU HAVE: 0
HOW MANY STANDARD DRINKS CONTAINING ALCOHOL DO YOU HAVE ON A TYPICAL DAY: PATIENT DOES NOT DRINK
EVER HAD A DRINK FIRST THING IN THE MORNING TO STEADY YOUR NERVES TO GET RID OF A HANGOVER: NO
DO YOU DRINK ALCOHOL: NO
HOW MANY TIMES IN THE PAST YEAR HAVE YOU HAD 5 OR MORE DRINKS IN A DAY: 0
TOTAL SCORE: 0
HAVE PEOPLE ANNOYED YOU BY CRITICIZING YOUR DRINKING: NO
TOTAL SCORE: 0
CONSUMPTION TOTAL: NEGATIVE
HOW OFTEN DO YOU HAVE SIX OR MORE DRINKS ON ONE OCCASION: NEVER
ALCOHOL_USE: NO
SKIP TO QUESTIONS 9-10: 1
HOW OFTEN DO YOU HAVE A DRINK CONTAINING ALCOHOL: NEVER
AVERAGE NUMBER OF DAYS PER WEEK YOU HAVE A DRINK CONTAINING ALCOHOL: 0
HAVE YOU EVER FELT YOU SHOULD CUT DOWN ON YOUR DRINKING: NO
EVER FELT BAD OR GUILTY ABOUT YOUR DRINKING: NO

## 2025-08-16 ASSESSMENT — GAIT ASSESSMENTS
DEVIATION: BRADYKINETIC;SHUFFLED GAIT
GAIT LEVEL OF ASSIST: CONTACT GUARD ASSIST
ASSISTIVE DEVICE: HAND HELD ASSIST
DISTANCE (FEET): 250

## 2025-08-16 ASSESSMENT — PAIN DESCRIPTION - PAIN TYPE
TYPE: ACUTE PAIN
TYPE: ACUTE PAIN

## 2025-08-16 ASSESSMENT — ACTIVITIES OF DAILY LIVING (ADL): TOILETING: INDEPENDENT

## 2025-08-17 ENCOUNTER — APPOINTMENT (OUTPATIENT)
Dept: RADIOLOGY | Facility: MEDICAL CENTER | Age: 80
End: 2025-08-17
Payer: MEDICARE

## 2025-08-17 VITALS
RESPIRATION RATE: 18 BRPM | HEART RATE: 63 BPM | SYSTOLIC BLOOD PRESSURE: 146 MMHG | OXYGEN SATURATION: 92 % | HEIGHT: 63 IN | DIASTOLIC BLOOD PRESSURE: 66 MMHG | BODY MASS INDEX: 34.38 KG/M2 | WEIGHT: 194 LBS | TEMPERATURE: 98.1 F

## 2025-08-17 PROBLEM — R42 VERTIGO: Status: ACTIVE | Noted: 2025-08-17

## 2025-08-17 PROBLEM — R29.90 STROKE-LIKE SYMPTOMS: Status: RESOLVED | Noted: 2025-08-16 | Resolved: 2025-08-17

## 2025-08-17 PROBLEM — Z90.81 HISTORY OF SPLENECTOMY: Status: ACTIVE | Noted: 2025-08-17

## 2025-08-17 PROBLEM — I49.3 SYMPTOMATIC PVCS: Status: RESOLVED | Noted: 2017-04-12 | Resolved: 2025-08-17

## 2025-08-17 PROBLEM — Z86.2 HISTORY OF ITP: Status: ACTIVE | Noted: 2025-08-17

## 2025-08-17 PROBLEM — Z65.8 PSYCHOSOCIAL STRESSORS: Status: ACTIVE | Noted: 2025-08-17

## 2025-08-17 PROBLEM — R42 VERTIGO: Status: RESOLVED | Noted: 2025-08-17 | Resolved: 2025-08-17

## 2025-08-17 LAB
ALBUMIN SERPL BCP-MCNC: 3.1 G/DL (ref 3.2–4.9)
ALBUMIN/GLOB SERPL: 1.2 G/DL
ALP SERPL-CCNC: 79 U/L (ref 30–99)
ALT SERPL-CCNC: 8 U/L (ref 2–50)
ANION GAP SERPL CALC-SCNC: 9 MMOL/L (ref 7–16)
AST SERPL-CCNC: 18 U/L (ref 12–45)
BILIRUB SERPL-MCNC: 0.3 MG/DL (ref 0.1–1.5)
BUN SERPL-MCNC: 19 MG/DL (ref 8–22)
CA-I SERPL-SCNC: 1.4 MMOL/L (ref 1.1–1.3)
CALCIUM ALBUM COR SERPL-MCNC: 11.1 MG/DL (ref 8.5–10.5)
CALCIUM SERPL-MCNC: 10.4 MG/DL (ref 8.5–10.5)
CHLORIDE SERPL-SCNC: 108 MMOL/L (ref 96–112)
CHOLEST SERPL-MCNC: 173 MG/DL (ref 100–199)
CO2 SERPL-SCNC: 23 MMOL/L (ref 20–33)
CREAT SERPL-MCNC: 0.82 MG/DL (ref 0.5–1.4)
GFR SERPLBLD CREATININE-BSD FMLA CKD-EPI: 72 ML/MIN/1.73 M 2
GLOBULIN SER CALC-MCNC: 2.6 G/DL (ref 1.9–3.5)
GLUCOSE SERPL-MCNC: 111 MG/DL (ref 65–99)
HDLC SERPL-MCNC: 57 MG/DL
LDLC SERPL CALC-MCNC: 102 MG/DL
POTASSIUM SERPL-SCNC: 4.4 MMOL/L (ref 3.6–5.5)
PROT SERPL-MCNC: 5.7 G/DL (ref 6–8.2)
PTH-INTACT SERPL-MCNC: 97.3 PG/ML (ref 14–72)
SODIUM SERPL-SCNC: 140 MMOL/L (ref 135–145)
TRIGL SERPL-MCNC: 69 MG/DL (ref 0–149)

## 2025-08-17 PROCEDURE — 82330 ASSAY OF CALCIUM: CPT

## 2025-08-17 PROCEDURE — A9270 NON-COVERED ITEM OR SERVICE: HCPCS

## 2025-08-17 PROCEDURE — 99239 HOSP IP/OBS DSCHRG MGMT >30: CPT | Performed by: STUDENT IN AN ORGANIZED HEALTH CARE EDUCATION/TRAINING PROGRAM

## 2025-08-17 PROCEDURE — 80053 COMPREHEN METABOLIC PANEL: CPT

## 2025-08-17 PROCEDURE — 36415 COLL VENOUS BLD VENIPUNCTURE: CPT

## 2025-08-17 PROCEDURE — G0378 HOSPITAL OBSERVATION PER HR: HCPCS

## 2025-08-17 PROCEDURE — 70551 MRI BRAIN STEM W/O DYE: CPT

## 2025-08-17 PROCEDURE — 700102 HCHG RX REV CODE 250 W/ 637 OVERRIDE(OP)

## 2025-08-17 PROCEDURE — 83970 ASSAY OF PARATHORMONE: CPT

## 2025-08-17 PROCEDURE — 80061 LIPID PANEL: CPT

## 2025-08-17 RX ORDER — LEVOTHYROXINE SODIUM 50 UG/1
50 TABLET ORAL
Qty: 30 TABLET | Refills: 2 | Status: SHIPPED | OUTPATIENT
Start: 2025-08-17 | End: 2025-08-18

## 2025-08-17 RX ORDER — MECLIZINE HYDROCHLORIDE 25 MG/1
25 TABLET ORAL 3 TIMES DAILY PRN
Qty: 30 TABLET | Refills: 0 | Status: SHIPPED | OUTPATIENT
Start: 2025-08-17 | End: 2025-08-18

## 2025-08-17 RX ORDER — CARBOXYMETHYLCELLULOSE SODIUM 5 MG/ML
1 SOLUTION/ DROPS OPHTHALMIC PRN
Status: DISCONTINUED | OUTPATIENT
Start: 2025-08-17 | End: 2025-08-17 | Stop reason: HOSPADM

## 2025-08-17 RX ADMIN — LEVOTHYROXINE SODIUM 75 MCG: 0.07 TABLET ORAL at 04:50

## 2025-08-17 RX ADMIN — LISINOPRIL 40 MG: 20 TABLET ORAL at 04:50

## 2025-08-17 ASSESSMENT — PAIN DESCRIPTION - PAIN TYPE: TYPE: ACUTE PAIN

## 2025-08-17 ASSESSMENT — FIBROSIS 4 INDEX: FIB4 SCORE: 2.15

## 2025-08-18 RX ORDER — LEVOTHYROXINE SODIUM 50 UG/1
50 TABLET ORAL
Qty: 30 TABLET | Refills: 2 | Status: SHIPPED | OUTPATIENT
Start: 2025-08-18

## 2025-08-18 RX ORDER — MECLIZINE HYDROCHLORIDE 25 MG/1
25 TABLET ORAL 3 TIMES DAILY PRN
Qty: 30 TABLET | Refills: 0 | Status: SHIPPED | OUTPATIENT
Start: 2025-08-18